# Patient Record
Sex: MALE | Race: WHITE | NOT HISPANIC OR LATINO | Employment: FULL TIME | URBAN - METROPOLITAN AREA
[De-identification: names, ages, dates, MRNs, and addresses within clinical notes are randomized per-mention and may not be internally consistent; named-entity substitution may affect disease eponyms.]

---

## 2024-03-31 ENCOUNTER — APPOINTMENT (EMERGENCY)
Dept: RADIOLOGY | Facility: HOSPITAL | Age: 49
End: 2024-03-31
Payer: COMMERCIAL

## 2024-03-31 ENCOUNTER — HOSPITAL ENCOUNTER (EMERGENCY)
Facility: HOSPITAL | Age: 49
Discharge: HOME | End: 2024-03-31
Attending: EMERGENCY MEDICINE
Payer: COMMERCIAL

## 2024-03-31 VITALS
DIASTOLIC BLOOD PRESSURE: 76 MMHG | HEART RATE: 120 BPM | OXYGEN SATURATION: 98 % | SYSTOLIC BLOOD PRESSURE: 153 MMHG | TEMPERATURE: 97.9 F | RESPIRATION RATE: 18 BRPM

## 2024-03-31 DIAGNOSIS — K59.00 CONSTIPATION: Primary | ICD-10-CM

## 2024-03-31 DIAGNOSIS — K92.1 BLOOD IN STOOL: ICD-10-CM

## 2024-03-31 LAB
ALBUMIN SERPL BCP-MCNC: 4.5 G/DL (ref 3.5–5)
ALP SERPL-CCNC: 89 U/L (ref 34–104)
ALT SERPL W P-5'-P-CCNC: 19 U/L (ref 7–52)
ANION GAP SERPL CALCULATED.3IONS-SCNC: 8 MMOL/L (ref 4–13)
AST SERPL W P-5'-P-CCNC: 22 U/L (ref 13–39)
BASOPHILS # BLD AUTO: 0.04 THOUSANDS/ÂΜL (ref 0–0.1)
BASOPHILS NFR BLD AUTO: 1 % (ref 0–1)
BILIRUB SERPL-MCNC: 0.43 MG/DL (ref 0.2–1)
BUN SERPL-MCNC: 13 MG/DL (ref 5–25)
CALCIUM SERPL-MCNC: 9.1 MG/DL (ref 8.4–10.2)
CHLORIDE SERPL-SCNC: 102 MMOL/L (ref 96–108)
CO2 SERPL-SCNC: 29 MMOL/L (ref 21–32)
CREAT SERPL-MCNC: 0.83 MG/DL (ref 0.6–1.3)
EOSINOPHIL # BLD AUTO: 0.14 THOUSAND/ÂΜL (ref 0–0.61)
EOSINOPHIL NFR BLD AUTO: 2 % (ref 0–6)
ERYTHROCYTE [DISTWIDTH] IN BLOOD BY AUTOMATED COUNT: 11.6 % (ref 11.6–15.1)
GFR SERPL CREATININE-BSD FRML MDRD: 101 ML/MIN/1.73SQ M
GLUCOSE SERPL-MCNC: 99 MG/DL (ref 65–140)
HCT VFR BLD AUTO: 47.8 % (ref 36.5–49.3)
HGB BLD-MCNC: 16.3 G/DL (ref 12–17)
IMM GRANULOCYTES # BLD AUTO: 0.02 THOUSAND/UL (ref 0–0.2)
IMM GRANULOCYTES NFR BLD AUTO: 0 % (ref 0–2)
LYMPHOCYTES # BLD AUTO: 1.79 THOUSANDS/ÂΜL (ref 0.6–4.47)
LYMPHOCYTES NFR BLD AUTO: 25 % (ref 14–44)
MCH RBC QN AUTO: 30.8 PG (ref 26.8–34.3)
MCHC RBC AUTO-ENTMCNC: 34.1 G/DL (ref 31.4–37.4)
MCV RBC AUTO: 90 FL (ref 82–98)
MONOCYTES # BLD AUTO: 0.63 THOUSAND/ÂΜL (ref 0.17–1.22)
MONOCYTES NFR BLD AUTO: 9 % (ref 4–12)
NEUTROPHILS # BLD AUTO: 4.66 THOUSANDS/ÂΜL (ref 1.85–7.62)
NEUTS SEG NFR BLD AUTO: 63 % (ref 43–75)
NRBC BLD AUTO-RTO: 0 /100 WBCS
PLATELET # BLD AUTO: 299 THOUSANDS/UL (ref 149–390)
PMV BLD AUTO: 9.2 FL (ref 8.9–12.7)
POTASSIUM SERPL-SCNC: 3.5 MMOL/L (ref 3.5–5.3)
PROT SERPL-MCNC: 7.3 G/DL (ref 6.4–8.4)
RBC # BLD AUTO: 5.3 MILLION/UL (ref 3.88–5.62)
SODIUM SERPL-SCNC: 139 MMOL/L (ref 135–147)
TSH SERPL DL<=0.05 MIU/L-ACNC: 1.28 UIU/ML (ref 0.45–4.5)
WBC # BLD AUTO: 7.28 THOUSAND/UL (ref 4.31–10.16)

## 2024-03-31 PROCEDURE — 36415 COLL VENOUS BLD VENIPUNCTURE: CPT

## 2024-03-31 PROCEDURE — 84443 ASSAY THYROID STIM HORMONE: CPT | Performed by: EMERGENCY MEDICINE

## 2024-03-31 PROCEDURE — 80053 COMPREHEN METABOLIC PANEL: CPT

## 2024-03-31 PROCEDURE — 74177 CT ABD & PELVIS W/CONTRAST: CPT

## 2024-03-31 PROCEDURE — 99285 EMERGENCY DEPT VISIT HI MDM: CPT | Performed by: EMERGENCY MEDICINE

## 2024-03-31 PROCEDURE — 85025 COMPLETE CBC W/AUTO DIFF WBC: CPT

## 2024-03-31 PROCEDURE — 99285 EMERGENCY DEPT VISIT HI MDM: CPT

## 2024-03-31 RX ORDER — DOCUSATE SODIUM 100 MG/1
100 CAPSULE, LIQUID FILLED ORAL EVERY 12 HOURS
Qty: 60 CAPSULE | Refills: 0 | Status: SHIPPED | OUTPATIENT
Start: 2024-03-31 | End: 2024-04-10 | Stop reason: ALTCHOICE

## 2024-03-31 RX ORDER — SENNOSIDES 8.6 MG
8.6 TABLET ORAL
Qty: 30 TABLET | Refills: 0 | Status: SHIPPED | OUTPATIENT
Start: 2024-03-31 | End: 2024-04-30

## 2024-03-31 RX ORDER — MAGNESIUM CARB/ALUMINUM HYDROX 105-160MG
296 TABLET,CHEWABLE ORAL ONCE
Status: COMPLETED | OUTPATIENT
Start: 2024-03-31 | End: 2024-03-31

## 2024-03-31 RX ADMIN — BE HEALTH MAGNESIUM CITRATE ORAL SOLUTION - LEMON 296 ML: 1.75 LIQUID ORAL at 23:40

## 2024-03-31 RX ADMIN — IOHEXOL 100 ML: 350 INJECTION, SOLUTION INTRAVENOUS at 21:38

## 2024-04-01 NOTE — ED ATTENDING ATTESTATION
3/31/2024  I, Gabino Damon MD, saw and evaluated the patient. I have discussed the patient with the resident/non-physician practitioner and agree with the resident's/non-physician practitioner's findings, Plan of Care, and MDM as documented in the resident's/non-physician practitioner's note, except where noted. All available labs and Radiology studies were reviewed.  I was present for key portions of any procedure(s) performed by the resident/non-physician practitioner and I was immediately available to provide assistance.       At this point I agree with the current assessment done in the Emergency Department.  I have conducted an independent evaluation of this patient a history and physical is as follows:    ED Course       49-year-old male presents with history of constipation and hard stools presenting with rectal pain and rectal bleeding x 2 tonight.  Patient states he was trying to pass a stool when he had severe pain in his rectum as well as bright red blood per rectum.  Patient denies any fevers or chills nausea or vomiting states that he is due for a colonoscopy by GI either manage she has constipation with medications.    Vitals reviewed abdomen soft with mild left lower quadrant tenderness to palpation no guarding or rebound.  Digital rectal exam done by resident shows no gross blood brown stool with mild tenderness at the 2 o'clock position on rectal exam.    Impression: Rectal pain, constipation  Differential diagnosis: Anal fissure, internal hemorrhoids, diverticular bleed, diverticulosis, diverticulitis    Plan check labs CTAP will offer patient mag citrate for constipation symptoms    If negative ED workup anticipate discharge with outpatient follow-up with GI for colonoscopy and further evaluation and management.    Labs reviewed CBC and CMP unremarkable.  TSH was sent to exclude endocrine causes of constipation which was within normal limits.  CT abdomen pelvis reviewed report no acute  pathology.      Critical Care Time  Procedures

## 2024-04-01 NOTE — ED PROVIDER NOTES
History  Chief Complaint   Patient presents with    Black or Bloody Stool     Reports dark and bloody stool the last few days. States problems with having a BM     HPI  Patient is 49-year-old male with past medical history of constipation and hard stools presenting to ED for rectal plain and bleeding.  Patient reports several difficult bowel movements yesterday and today and noticed bright red blood per rectum while wiping. Patient also reports mild abdominal fullness. Patient reports has seen GI for constipation in the past, but has not had colonoscopy. Patient denies fever, chills, nausea/vomiting.  None       History reviewed. No pertinent past medical history.    History reviewed. No pertinent surgical history.    History reviewed. No pertinent family history.  I have reviewed and agree with the history as documented.    E-Cigarette/Vaping     E-Cigarette/Vaping Substances     Social History     Tobacco Use    Smoking status: Never    Smokeless tobacco: Never        Review of Systems   Constitutional:  Negative for chills and fever.   HENT:  Negative for ear pain and sore throat.    Respiratory:  Negative for cough and shortness of breath.    Cardiovascular:  Negative for chest pain, palpitations and leg swelling.   Gastrointestinal:  Positive for abdominal pain, blood in stool and constipation. Negative for diarrhea, nausea and vomiting.   Genitourinary:  Negative for dysuria, frequency and hematuria.   Musculoskeletal:  Negative for back pain and neck pain.   Skin:  Negative for rash.   Neurological:  Negative for dizziness, light-headedness and headaches.       Physical Exam  ED Triage Vitals [03/31/24 1940]   Temperature Pulse Respirations Blood Pressure SpO2   97.9 °F (36.6 °C) (!) 120 18 153/76 98 %      Temp Source Heart Rate Source Patient Position - Orthostatic VS BP Location FiO2 (%)   Oral Monitor Lying Left arm --      Pain Score       --             Orthostatic Vital Signs  Vitals:    03/31/24 1940    BP: 153/76   Pulse: (!) 120   Patient Position - Orthostatic VS: Lying       Physical Exam  Vitals reviewed. Exam conducted with a chaperone present.   Constitutional:       General: He is awake.   HENT:      Head: Normocephalic and atraumatic.      Mouth/Throat:      Mouth: Mucous membranes are moist.   Eyes:      Extraocular Movements: Extraocular movements intact.      Right eye: No nystagmus.      Left eye: No nystagmus.      Conjunctiva/sclera: Conjunctivae normal.      Pupils: Pupils are equal, round, and reactive to light.   Cardiovascular:      Rate and Rhythm: Normal rate and regular rhythm.      Pulses: Normal pulses.      Heart sounds: Normal heart sounds, S1 normal and S2 normal. Heart sounds not distant. No murmur heard.     No friction rub. No gallop.   Pulmonary:      Breath sounds: No stridor. No wheezing, rhonchi or rales.      Comments: CTA b/l   Abdominal:      General: Bowel sounds are normal.      Palpations: Abdomen is soft.      Tenderness: There is no abdominal tenderness.   Genitourinary:     Comments: No external hemorrhoids visualized.  Mild tenderness with rectal exam at 2 o'clock, no fissures or internal hemorrhoids palpated. Hemoccult negative  Musculoskeletal:      Right lower leg: No edema.      Left lower leg: No edema.   Skin:     General: Skin is warm and dry.      Capillary Refill: Capillary refill takes less than 2 seconds.   Neurological:      Mental Status: He is alert and oriented to person, place, and time.      GCS: GCS eye subscore is 4. GCS verbal subscore is 5. GCS motor subscore is 6.         ED Medications  Medications   iohexol (OMNIPAQUE) 350 MG/ML injection (MULTI-DOSE) 100 mL (100 mL Intravenous Given 3/31/24 2138)   magnesium citrate (CITROMA) oral solution 296 mL (296 mL Oral Given 3/31/24 2340)       Diagnostic Studies  Results Reviewed       Procedure Component Value Units Date/Time    TSH, 3rd generation with Free T4 reflex [[id number]]  (Normal) Collected:  03/31/24 2100    Lab Status: Final result Specimen: Blood from Arm, Left Updated: 03/31/24 2142     TSH 3RD GENERATON 1.283 uIU/mL     Comprehensive metabolic panel [[id number]] Collected: 03/31/24 2027    Lab Status: Final result Specimen: Blood from Arm, Left Updated: 03/31/24 2056     Sodium 139 mmol/L      Potassium 3.5 mmol/L      Chloride 102 mmol/L      CO2 29 mmol/L      ANION GAP 8 mmol/L      BUN 13 mg/dL      Creatinine 0.83 mg/dL      Glucose 99 mg/dL      Calcium 9.1 mg/dL      AST 22 U/L      ALT 19 U/L      Alkaline Phosphatase 89 U/L      Total Protein 7.3 g/dL      Albumin 4.5 g/dL      Total Bilirubin 0.43 mg/dL      eGFR 101 ml/min/1.73sq m     Narrative:      National Kid[employer] Foundation guidelines for Chronic Kidney Disease (CKD):     Stage 1 with normal or high GFR (GFR > 90 mL/min/1.73 square meters)    Stage 2 Mild CKD (GFR = 60-89 mL/min/1.73 square meters)    Stage 3A Moderate CKD (GFR = 45-59 mL/min/1.73 square meters)    Stage 3B Moderate CKD (GFR = 30-44 mL/min/1.73 square meters)    Stage 4 Severe CKD (GFR = 15-29 mL/min/1.73 square meters)    Stage 5 End Stage CKD (GFR <15 mL/min/1.73 square meters)  Note: GFR calculation is accurate only with a steady state creatinine    CBC and differential [[id number]] Collected: 03/31/24 2027    Lab Status: Final result Specimen: Blood from Arm, Left Updated: 03/31/24 2034     WBC 7.28 Thousand/uL      RBC 5.30 Million/uL      Hemoglobin 16.3 g/dL      Hematocrit 47.8 %      MCV 90 fL      MCH 30.8 pg      MCHC 34.1 g/dL      RDW 11.6 %      MPV 9.2 fL      Platelets 299 Thousands/uL      nRBC 0 /100 WBCs      Neutrophils Relative 63 %      Immature Grans % 0 %      Lymphocytes Relative 25 %      Monocytes Relative 9 %      Eosinophils Relative 2 %      Basophils Relative 1 %      Neutrophils Absolute 4.66 Thousands/µL      Absolute Immature Grans 0.02 Thousand/uL      Absolute Lymphocytes 1.79 Thousands/µL      Absolute Monocytes 0.63  Thousand/µL      Eosinophils Absolute 0.14 Thousand/µL      Basophils Absolute 0.04 Thousands/µL                    CT abdomen pelvis with contrast   Final Result by Rafiq Delgadillo MD (04/01 0831)      No acute pathology.      Finding (s) were preliminarily reported by [facility] service at the time of examination.               Workstation performed: [id number]               Procedures  Procedures      ED Course  ED Course as of 04/02/24 2349   Sun Mar 31, 2024   2039 WBC: 7.28   2039 Hemoglobin: 16.3   2039 Platelet Count: 299   2111 Comprehensive metabolic panel  Unremarkable    2151 TSH 3RD GENERATON: 1.283   2250 CT abdomen pelvis with contrast  Vrad: IMPRESSION: 1. No abdominal or pelvic masses. 2. No evidence of acute inflammation.     2250 Will discharge with bowel regimen and GI follow up.                                        Medical Decision Making  Amount and/or Complexity of Data Reviewed  Labs: ordered. Decision-making details documented in ED Course.  Radiology: ordered. Decision-making details documented in ED Course.    Risk  OTC drugs.  Prescription drug management.        Patient is 49 y.o. male with PMH of constipation and hard stools presenting to ED for rectal plain and bleeding See history and physical documented above.     Differential diagnosis included but not limited to anal fissure, internal hemorrhoids, diverticular bleeding, diverticulosis, diverticulitis, constipation. Plan CBC, CMP, TSH. CT abdomen/pelvis offered and requested.    View ED course above for further discussion on patient workup.     All labs reviewed and utilized in the medical decision making process  All radiology studies independently viewed by me and interpreted by the radiologist.  I reviewed all testing with the patient.     Upon re-evaluation discussed labs and CT with patient. .Will give magnesium citrate to help with bowel movements as well as prescribe bowel regimen to relieve  constipation.    I have reviewed the patient's vital signs, nursing notes, and other relevant tests/information. I had a detailed discussion with the patient regarding the history, exam findings, and any diagnostic results.   Plan to discharge home in stable condition with constipation, follow up with GI.  Discussed with patient who is agreeable to plan.  I discussed discharge instructions, need for follow-up, and oral return precautions for what to return for in addition to the written return precautions and discharge instructions, specifically highlighting areas of special concern.  The patient verbalized understanding of the discharge instructions and warnings that would necessitate return to the Emergency Department including worsening abdominal pain, fevers, nausea/vomiting.  All questions the patient had were answered prior to discharge to the best of my ability.       Disposition  Final diagnoses:   Constipation   Blood in stool     Time reflects when diagnosis was documented in both MDM as applicable and the Disposition within this note       Time User Action Codes Description Comment    3/31/2024 11:25 PM Basia Salazar [K59.00] Constipation     3/31/2024 11:25 PM Basia Salazar [K92.1] Blood in stool           ED Disposition       ED Disposition   Discharge    Condition   Stable    Date/Time   Sun Mar 31, 2024 11:04 PM    Comment   Devon Rollins discharge to home/self care.                   Follow-up Information       Follow up With Specialties Details Why Contact Info Additional Information    [facility] Medicine Call  Establish primary care [address] [address] [phone] [facility], [address], [place], [address] [phone]            Discharge Medication List as of 3/31/2024 11:35 PM        START taking these medications    Details   docusate sodium  (COLACE) 100 mg capsule Take 1 capsule (100 mg total) by mouth every 12 (twelve) hours, Starting Sun 3/31/2024, Normal      senna (SENOKOT) 8.6 mg Take 1 tablet (8.6 mg total) by mouth daily at bedtime, Starting Sun 3/31/2024, Until Tue 4/30/2024, Normal               PDMP Review       None             ED Provider  Attending physically available and evaluated Devon Rollins. I managed the patient along with the ED Attending.    Electronically Signed by           Basia Salazar DO  04/02/24 6966

## 2024-04-01 NOTE — DISCHARGE INSTRUCTIONS
You were seen in the ED for blood in stool. Your workup was negative for anything dangerous.     Your CT showed constipation.     Please drink entire bottle of mag citrate to help with constipation.     You can also colace twice daily and senokot at night to help regulate your bowels.     Please establish care with PCP and GI for follow up.     Please return to ED if you have worsening abdominal pain, fevers, nausea/vomiting.

## 2024-04-02 ENCOUNTER — TELEPHONE (OUTPATIENT)
Age: 49
End: 2024-04-02

## 2024-04-02 NOTE — TELEPHONE ENCOUNTER
Patient called in requesting a virtual visit. Patient was informed due to symptoms he would need to be seen in the office. Patient states he will call tomorrow if he would need to reschedule.

## 2024-04-06 ENCOUNTER — OFFICE VISIT (OUTPATIENT)
Dept: URGENT CARE | Facility: MEDICAL CENTER | Age: 49
End: 2024-04-06
Payer: COMMERCIAL

## 2024-04-06 VITALS
TEMPERATURE: 98 F | DIASTOLIC BLOOD PRESSURE: 72 MMHG | RESPIRATION RATE: 18 BRPM | HEART RATE: 75 BPM | SYSTOLIC BLOOD PRESSURE: 125 MMHG | OXYGEN SATURATION: 99 %

## 2024-04-06 DIAGNOSIS — F41.0 PANIC ATTACKS: Primary | ICD-10-CM

## 2024-04-06 DIAGNOSIS — Z76.89 ENCOUNTER TO ESTABLISH CARE: ICD-10-CM

## 2024-04-06 PROCEDURE — G0381 LEV 2 HOSP TYPE B ED VISIT: HCPCS

## 2024-04-06 RX ORDER — MULTIVITAMIN WITH FOLIC ACID 400 MCG
1 TABLET ORAL DAILY
COMMUNITY

## 2024-04-06 RX ORDER — OMEGA-3/DHA/EPA/FISH OIL 300-1000MG
CAPSULE ORAL
COMMUNITY

## 2024-04-06 RX ORDER — ALPRAZOLAM 0.25 MG/1
0.25 TABLET ORAL
Qty: 10 TABLET | Refills: 0 | Status: SHIPPED | OUTPATIENT
Start: 2024-04-06

## 2024-04-06 RX ORDER — IBUPROFEN 200 MG
200 TABLET ORAL
COMMUNITY

## 2024-04-06 NOTE — PROGRESS NOTES
[facility]        NAME: Devon Rollins Jr. is a 49 y.o. male  : 1975    MRN: [id number]  DATE: 2024  TIME: 8:09 PM    Assessment and Plan   Panic attacks [F41.0]  1. Panic attacks  ALPRAZolam (XANAX) 0.25 mg tablet    Ambulatory Referral to Family Practice      2. Encounter to establish care  Ambulatory Referral to Family Practice        History of anxiety, recent worsening in panic attacks, possibly related to work stress. No suicidal or homicidal thoughts. Short supply of Xanax provided. Referral to PCP provided, patient has not been able to establish care since moving here about one year ago.    Patient Instructions     Prescribed short supply of Xanax, take as needed for anxiety/panic attacks (may cause drowsiness).  It is important you establish care with a primary care provider for further management of your anxiety and other health conditions.    Proceed to  ER if symptoms worsen.    If tests are performed, our office will contact you with results only if changes need to made to the care plan discussed with you at the visit. You can review your full results on [facility].    Chief Complaint     Chief Complaint   Patient presents with    Anxiety     Patient c/o panic attacks that have been more frequent over the last 2-3 weeks.           History of Present Illness       Patient presents with worsening panic attacks over the past 2-3 weeks. He is dealing with an increase in his workload recently. He notes he has been staying up late and not sleeping well. His panic attacks last from minutes to hours. When they occur, he has a tightness in his chest, racing heart, shortness of breath. He has been on Paxil and Zoloft in the past without any relief. He also tried Xanax for acute anxiety and notes that seemed to help. He has done therapy in the past without significant relief. His last panic attack was yesterday afternoon. The panic attacks are significantly affecting his  activities of daily living. He denies any thoughts of harming himself or others. He does not have any panic attack symptoms currently.        Review of Systems   Review of Systems   Respiratory:  Positive for chest tightness and shortness of breath.    Cardiovascular:  Positive for palpitations.   Psychiatric/Behavioral:  Negative for self-injury and suicidal ideas. The patient is nervous/anxious.      No symptoms currently.      Current Medications       Current Outpatient Medications:     ALPRAZolam (XANAX) 0.25 mg tablet, Take 1 tablet (0.25 mg total) by mouth daily at bedtime as needed for anxiety, Disp: 10 tablet, Rfl: 0    cholecalciferol 1,000 units tablet, Take 1,000 Units by mouth daily, Disp: , Rfl:     docusate sodium (COLACE) 100 mg capsule, Take 1 capsule (100 mg total) by mouth every 12 (twelve) hours, Disp: 60 capsule, Rfl: 0    fish oil-omega-3 fatty acids 1000 MG capsule, Take by mouth, Disp: , Rfl:     ibuprofen (MOTRIN) 200 mg tablet, 200 mg, Disp: , Rfl:     Magnesium 100 MG CAPS, Take by mouth, Disp: , Rfl:     Multiple Vitamin (Tab-A-Constantino) TABS, Take 1 tablet by mouth daily, Disp: , Rfl:     senna (SENOKOT) 8.6 mg, Take 1 tablet (8.6 mg total) by mouth daily at bedtime, Disp: 30 tablet, Rfl: 0    Current Allergies     Allergies as of 04/06/2024    (No Known Allergies)            The following portions of the patient's history were reviewed and updated as appropriate: allergies, current medications, past family history, past medical history, past social history, past surgical history and problem list.     No past medical history on file.    No past surgical history on file.    No family history on file.      Medications have been verified.        Objective   /72   Pulse 75   Temp 98 °F (36.7 °C)   Resp 18   SpO2 99%        Physical Exam     Physical Exam  Vitals and nursing note reviewed.   Constitutional:       General: He is not in acute distress.     Appearance: Normal appearance.  He is not ill-appearing.   Cardiovascular:      Rate and Rhythm: Normal rate and regular rhythm.      Pulses: Normal pulses.      Heart sounds: Normal heart sounds.   Pulmonary:      Effort: Pulmonary effort is normal.      Breath sounds: Normal breath sounds.   Neurological:      Mental Status: He is alert.

## 2024-04-06 NOTE — PATIENT INSTRUCTIONS
Prescribed short supply of Xanax, take as needed for anxiety/panic attacks (may cause drowsiness).  It is important you establish care with a primary care provider for further management of your anxiety and other health conditions.    Proceed to  ER if symptoms worsen.    If tests are performed, our office will contact you with results only if changes need to made to the care plan discussed with you at the visit. You can review your full results on [facility] My[facility].    Panic Attack   AMBULATORY CARE:   A panic attack  is a strong feeling of fear or discomfort. The attack starts suddenly, is worst 10 minutes after it starts, and stops within 20 minutes. An attack may be triggered by something you do, such as public speaking. Exposure to something you are afraid of can also trigger an attack. A panic attack can also happen for no clear reason. Panic attacks that happen often may be a sign of a panic disorder that needs long-term treatment.  Common signs and symptoms of a panic attack:   Chest pain or discomfort, or fast or irregular heartbeats    Sweating, trembling, lightheartedness, or fainting    Hyperventilation (breathing so quickly you become dizzy, lightheaded, or faint)    Shortness of breath, trouble breathing, or a feeling that you are choking or smothering    Pale or cold skin, chills, or hot flashes    Nausea, vomiting, or abdominal pain    A feeling that you are separate from your body       Call your local emergency number (911 in the [place]) if:   You have any of the following signs of a heart attack:      Squeezing, pressure, or pain in your chest    You may  also have any of the following:     Discomfort or pain in your back, neck, jaw, stomach, or arm    Shortness of breath    Nausea or vomiting    Lightheadedness or a sudden cold sweat      Call your doctor or therapist if:   You have new or worsening panic attacks after treatment.    You have questions or concerns about your condition or  care.    Treatment  may include any of the following:  Medicines  may be given to make you feel more relaxed or to reduce anxiety that causes a panic attack. Some medicines are taken only when you are having a panic attack. Other medicines can be taken to prevent panic attacks.     A behavior therapist  can help you learn to control how your body responds to stressful situations. The therapist may also teach you ways to relax your muscles and slow your breathing during a panic attack. The therapist may teach you ways to assure yourself that the panic attack will not get worse. You may also learn ways to prevent or stop hyperventilation.    Exposure therapy  is used to help you change your reaction to triggers. You are exposed to your panic attack triggers in small amounts. The amount of exposure is slowly increased until it no longer triggers a panic attack.    Manage or prevent a panic attack:   Manage stress.  Stress can trigger a panic attack. Ways to lower your stress level include yoga, meditation, and talking to someone about the stress in your life.    Exercise as directed.  Exercise can reduce stress and help you sleep better. Try to get at least 30 minutes of physical activity on most days of the week. Your healthcare provider can help you create an exercise plan.    Set a sleep schedule.  Too little sleep can increase anxiety. Go to bed at the same time each night and wake up at the same time each morning. Keep your room quiet and free from distractions, such as a television or computer.    Eat a variety of healthy foods.  Healthy foods include fruits, vegetables, low-fat dairy products, lean meats, fish, and beans. Limit sugar. Sugar can increase your symptoms.    Do not have foods or drinks that contain caffeine.  These include coffee, tea, soda, energy drinks, and chocolate. Caffeine can make anxiety worse or trigger a panic attack.    Limit alcohol.  You may think alcohol makes you calmer, but it is  not a safe or effective way to control anxiety. Alcohol can increase anxiety if you drink large amounts or drink often. Ask your healthcare provider how much alcohol is okay for you to drink. A drink of alcohol is 12 ounces of beer, 5 ounces of wine, or 1½ ounces of liquor.    Do not smoke.  Nicotine and other chemicals in cigarettes and cigars can increase anxiety. Ask your healthcare provider for information if you currently smoke and need help to quit. E-cigarettes or smokeless tobacco still contain nicotine. Talk to your healthcare provider before you use these products.       Follow up with your doctor or therapist as directed:  Write down your questions so you remember to ask them during your visits.  © Copyright [employer] 2023 Information is for End User's use only and may not be sold, redistributed or otherwise used for commercial purposes.  The above information is an  only. It is not intended as medical advice for individual conditions or treatments. Talk to your doctor, nurse or pharmacist before following any medical regimen to see if it is safe and effective for you.

## 2024-04-10 ENCOUNTER — OFFICE VISIT (OUTPATIENT)
Dept: GASTROENTEROLOGY | Facility: CLINIC | Age: 49
End: 2024-04-10

## 2024-04-10 ENCOUNTER — PREP FOR PROCEDURE (OUTPATIENT)
Dept: GASTROENTEROLOGY | Facility: CLINIC | Age: 49
End: 2024-04-10

## 2024-04-10 VITALS
SYSTOLIC BLOOD PRESSURE: 100 MMHG | BODY MASS INDEX: 20.59 KG/M2 | WEIGHT: 152 LBS | HEART RATE: 80 BPM | DIASTOLIC BLOOD PRESSURE: 66 MMHG | HEIGHT: 72 IN

## 2024-04-10 DIAGNOSIS — R19.4 CHANGE IN BOWEL HABIT: Primary | ICD-10-CM

## 2024-04-10 DIAGNOSIS — R14.0 BLOATING: ICD-10-CM

## 2024-04-10 DIAGNOSIS — K92.2 LOWER GI BLEEDING: ICD-10-CM

## 2024-04-10 DIAGNOSIS — K59.09 CHRONIC CONSTIPATION: ICD-10-CM

## 2024-04-10 PROBLEM — E78.00 HYPERCHOLESTEREMIA: Status: ACTIVE | Noted: 2024-04-10

## 2024-04-10 PROBLEM — F32.A DEPRESSION: Status: ACTIVE | Noted: 2024-04-10

## 2024-04-10 PROBLEM — F41.1 GAD (GENERALIZED ANXIETY DISORDER): Status: ACTIVE | Noted: 2024-04-10

## 2024-04-10 NOTE — PATIENT INSTRUCTIONS
Can use GasX OTC as needed   Drink at least 64 oz water/ day   Stop Colace and start miralax OTC once daily instead   Can continue senna at bedtime     Scheduled date of colonoscopy (as of today): 05/20/2024  Physician performing colonoscopy: Dr. Krishna   Location of colonoscopy:   Bowel prep reviewed with patient: 2 Day [facility]   Instructions reviewed with patient by: Breana LOREDO   Clearances:  N/A

## 2024-04-10 NOTE — PROGRESS NOTES
[facility] - Outpatient Consultation New Patient  Devon Rollins Jr. 49 y.o. male MRN: [id number]  Encounter: [id number]    ASSESSMENT AND PLAN:    1. Change in bowel habit  2. Chronic constipation  3. Lower GI bleeding  4. Bloating  Patient presenting with constipation for many years with recent worsening over the last several months.  There is associated lower GI bleeding and bloating.  His weight has been stable.  No prior colonoscopy.  He denies family history of colon cancer or colon polyps.    At this time we will order celiac panel and CRP.  Discussed with patient that his celiac testing may be an accurate if you follows mostly gluten-free diet already.  I recommended patient start MiraLAX powder over-the-counter, 1 capful daily and stop Colace.  Discussed can continue to use senna once daily at bedtime if needed.  I also recommended that he start more of a high-fiber diet and increase his water intake and activity level.  Discussed that he can use Gas-X over-the-counter as needed for gas and bloating  We will plan for a colonoscopy for further evaluation of his ongoing symptoms, explained to the patient that I recommend he undergo a 2-day bowel prep.  He expressed understanding.    - IgA; Future  - Tissue transglutaminase, IgA; Future  - C-reactive protein; Future  - Colonoscopy; Future  - polyethylene glycol (GOLYTELY) 4000 mL solution; Take 4,000 mL by mouth once for 1 dose Take as directed by office instructions prior to colonoscopy.  Dispense: 4000 mL; Refill: 0  - Ambulatory Referral to Gastroenterology    I obtained informed consent from the patient the risk/benefits/alternatives of the procedure/s were discussed with the patient.  Risks included, but not limited to, infection, bleeding, perforation, injury to organs in the abdomen, missed lesion and incomplete procedure were discussed.  Patient was agreeable and electronic signature was obtained.      Patient was  "instructed to call the office with any questions, concerns, new/ worsening/ persisting GI symptoms. Advised patient go to the ER with any severe or worsening abdominal pain, fevers/ chills, intractable N/V, chest pain, SOB, dizziness, lightheadedness, feeling something stuck in esophagus that will not go down. Patient expressed understanding and is in agreement with treatment plan.       Will plan to follow up after diagnostic tests   ________________________________________________________    HPI:      Patient is new to me in our office.  Patient with a past medical history of anxiety and depression, hyperlipidemia presents to the office today with complaints of constipation.  Patient was seen in the emergency room 3/31/2024 with complaints of constipation, rectal pain, bleeding.  ER note was reviewed.  Rectal exam was performed in the ER which showed no obvious external hemorrhoids.  There is mild tenderness with rectal exam at 2:00 but no fissures or internal hemorrhoids were palpated.  Hemoccult testing was negative.  He underwent blood tests in the ER which revealed normal CBC, CMP, TSH.  He underwent CT of the abdomen and pelvis with contrast, this was reviewed, this showed no abdominal or pelvic masses, no evidence of acute inflammation.  He was discharged with on Colace and senna and told to follow-up with GI.    Patient complains of constipation x many years.   He complains that his constipation has worsened over the last few years, and moreso  the constipation has worsened over the last several months.   He can go several days without a BM.   Stool caliber is often bristol stool type 1 or \"very thin\".   There is associated abdominal bloating, gas, nausea   His constipation can be improved with high fiber foods sometimes but this makes his abdominal bloating and gas worse.  He does know his constipation improves with exercise  He states he was told he had gluten sensitivity in the past. Unsure if was ever " tested for celiac specifically. He trys to avoid gluten generally.   He does endorse occasional rectal bleeding as well. There was a significant amount of blood in toilet bowl prior to ER visit.   Since ER visit he is taking colace BID and senna once daily at bedtime. He this may be helping.   Patient denies any fevers/ chills, unintentional weight loss, abdominal pain,  vomiting,  heartburn, dysphagia, odynophagia.   Denies chest pain, SOB    Tobacco use- None  Alcohol use- None  NSAID use- None  No prior colonoscopy   No FH of CRC or colon polyps     REVIEW OF SYSTEMS:    Review of Systems   Constitutional:  Positive for appetite change (decreased). Negative for chills and fever.   HENT:  Negative for ear pain and sore throat.    Eyes:  Negative for pain and visual disturbance.   Respiratory:  Negative for cough and shortness of breath.    Cardiovascular:  Negative for chest pain and palpitations.   Gastrointestinal:  Positive for constipation and nausea. Negative for abdominal pain and vomiting.   Genitourinary:  Negative for dysuria and hematuria.   Musculoskeletal:  Negative for arthralgias and back pain.   Skin:  Negative for color change and rash.   Neurological:  Negative for seizures and syncope.   All other systems reviewed and are negative.       Historical Information   History reviewed. No pertinent past medical history.  Past Surgical History:   Procedure Laterality Date    KNEE ARTHROSCOPY      SHOULDER ARTHROSCOPY       Social History   Social History     Substance and Sexual Activity   Alcohol Use Yes    Comment: social     Social History     Substance and Sexual Activity   Drug Use Never     Social History     Tobacco Use   Smoking Status Never   Smokeless Tobacco Never     Family History   Problem Relation Age of Onset    Stomach cancer Cousin        Meds/Allergies       Current Outpatient Medications:     ALPRAZolam (XANAX) 0.25 mg tablet    cholecalciferol 1,000 units tablet    docusate sodium  (COLACE) 100 mg capsule    fish oil-omega-3 fatty acids 1000 MG capsule    ibuprofen (MOTRIN) 200 mg tablet    Magnesium 100 MG CAPS    Multiple Vitamin (Tab-A-Constantino) TABS    senna (SENOKOT) 8.6 mg    No Known Allergies        Objective   Wt Readings from Last 3 Encounters:   04/10/24 68.9 kg (152 lb)     Temp Readings from Last 3 Encounters:   04/06/24 98 °F (36.7 °C)   03/31/24 97.9 °F (36.6 °C) (Oral)     BP Readings from Last 3 Encounters:   04/10/24 100/66   04/06/24 125/72   03/31/24 153/76     Pulse Readings from Last 3 Encounters:   04/10/24 80   04/06/24 75   03/31/24 (!) 120        PHYSICAL EXAM:     Physical Exam  Vitals reviewed.   Constitutional:       General: He is not in acute distress.     Appearance: He is well-developed.   HENT:      Head: Normocephalic and atraumatic.   Eyes:      Conjunctiva/sclera: Conjunctivae normal.   Cardiovascular:      Rate and Rhythm: Normal rate and regular rhythm.      Heart sounds: No murmur heard.  Pulmonary:      Effort: Pulmonary effort is normal. No respiratory distress.      Breath sounds: Normal breath sounds.   Abdominal:      General: Bowel sounds are normal.      Palpations: Abdomen is soft.      Tenderness: There is no abdominal tenderness.   Musculoskeletal:         General: No swelling.      Cervical back: Neck supple.   Skin:     General: Skin is warm and dry.      Capillary Refill: Capillary refill takes less than 2 seconds.   Neurological:      General: No focal deficit present.      Mental Status: He is alert and oriented to person, place, and time.   Psychiatric:         Mood and Affect: Mood normal.         Behavior: Behavior normal.           Lab Results:   Lab Results   Component Value Date    WBC 7.28 03/31/2024    HGB 16.3 03/31/2024    HCT 47.8 03/31/2024    MCV 90 03/31/2024     03/31/2024       Lab Results   Component Value Date    SODIUM 139 03/31/2024    K 3.5 03/31/2024     03/31/2024    CO2 29 03/31/2024    AGAP 8 03/31/2024     BUN 13 03/31/2024    CREATININE 0.83 03/31/2024    GLUC 99 03/31/2024    CALCIUM 9.1 03/31/2024    AST 22 03/31/2024    ALT 19 03/31/2024    ALKPHOS 89 03/31/2024    TP 7.3 03/31/2024    TBILI 0.43 03/31/2024    EGFR 101 03/31/2024     Lab Results   Component Value Date    BSK5IWEYVKFB 1.283 03/31/2024    TSH 1.06 04/08/2019         Radiology Results:   CT abdomen pelvis with contrast    Result Date: 4/1/2024  Narrative: CT ABDOMEN AND PELVIS WITH IV CONTRAST INDICATION: LLQ pain rectal pain. COMPARISON: None. TECHNIQUE: CT examination of the abdomen and pelvis was performed. Multiplanar 2D reformatted images were created from the source data. This examination, like all CT scans performed in the [facility], was performed utilizing techniques to minimize radiation dose exposure, including the use of iterative reconstruction and automated exposure control. Radiation dose length product (DLP) for this visit: 314.52 mGy-cm IV Contrast: 100 mL of iohexol (OMNIPAQUE) Enteric Contrast: Not administered. FINDINGS: ABDOMEN LOWER CHEST: No clinically significant abnormality in the visualized lower chest. LIVER/BILIARY TREE: Unremarkable. GALLBLADDER: No calcified gallstones. No pericholecystic inflammatory change. SPLEEN: Unremarkable. PANCREAS: Unremarkable. ADRENAL GLANDS: Unremarkable. KIDNEYS/URETERS: Unremarkable. No hydronephrosis. STOMACH AND BOWEL: No obstruction or inflammatory changes. Discoid hyperdensity compatible with medication noted in the duodenum. APPENDIX: Normal. ABDOMINOPELVIC CAVITY: No ascites. No pneumoperitoneum. No lymphadenopathy. VESSELS: Unremarkable for patient's age. PELVIS REPRODUCTIVE ORGANS: Mildly enlarged prostate gland. URINARY BLADDER: Unremarkable. ABDOMINAL WALL/INGUINAL REGIONS: Unremarkable. BONES: No acute fracture or suspicious osseous lesion.     Impression: No acute pathology. Finding (s) were preliminarily reported by [facility] service at  the time of examination. Workstation performed: [id number]       Cielo Gardner PA-C   Available on [employer] Hat

## 2024-04-19 ENCOUNTER — OFFICE VISIT (OUTPATIENT)
Dept: FAMILY MEDICINE CLINIC | Facility: CLINIC | Age: 49
End: 2024-04-19
Payer: COMMERCIAL

## 2024-04-19 ENCOUNTER — TELEPHONE (OUTPATIENT)
Dept: FAMILY MEDICINE CLINIC | Facility: CLINIC | Age: 49
End: 2024-04-19

## 2024-04-19 VITALS
OXYGEN SATURATION: 98 % | HEART RATE: 71 BPM | HEIGHT: 72 IN | DIASTOLIC BLOOD PRESSURE: 70 MMHG | BODY MASS INDEX: 20.59 KG/M2 | TEMPERATURE: 97.1 F | WEIGHT: 152 LBS | SYSTOLIC BLOOD PRESSURE: 120 MMHG

## 2024-04-19 DIAGNOSIS — E78.00 HYPERCHOLESTEREMIA: ICD-10-CM

## 2024-04-19 DIAGNOSIS — Z11.4 SCREENING FOR HIV (HUMAN IMMUNODEFICIENCY VIRUS): ICD-10-CM

## 2024-04-19 DIAGNOSIS — K59.09 OTHER CONSTIPATION: ICD-10-CM

## 2024-04-19 DIAGNOSIS — R53.83 FATIGUE, UNSPECIFIED TYPE: ICD-10-CM

## 2024-04-19 DIAGNOSIS — Z11.59 NEED FOR HEPATITIS C SCREENING TEST: ICD-10-CM

## 2024-04-19 DIAGNOSIS — Z76.89 ENCOUNTER TO ESTABLISH CARE: Primary | ICD-10-CM

## 2024-04-19 DIAGNOSIS — F41.0 PANIC ATTACKS: ICD-10-CM

## 2024-04-19 DIAGNOSIS — Z71.3 DIETARY COUNSELING: ICD-10-CM

## 2024-04-19 DIAGNOSIS — F41.1 GAD (GENERALIZED ANXIETY DISORDER): ICD-10-CM

## 2024-04-19 DIAGNOSIS — F32.2 MODERATELY SEVERE MAJOR DEPRESSION (HCC): ICD-10-CM

## 2024-04-19 DIAGNOSIS — Z71.82 EXERCISE COUNSELING: ICD-10-CM

## 2024-04-19 PROCEDURE — 99204 OFFICE O/P NEW MOD 45 MIN: CPT | Performed by: STUDENT IN AN ORGANIZED HEALTH CARE EDUCATION/TRAINING PROGRAM

## 2024-04-19 RX ORDER — POLYETHYLENE GLYCOL 3350 17 G/17G
17 POWDER, FOR SOLUTION ORAL DAILY
COMMUNITY

## 2024-04-19 RX ORDER — HYDROXYZINE PAMOATE 25 MG/1
25 CAPSULE ORAL 3 TIMES DAILY PRN
Qty: 30 CAPSULE | Refills: 0 | Status: SHIPPED | OUTPATIENT
Start: 2024-04-19 | End: 2024-04-25

## 2024-04-19 NOTE — TELEPHONE ENCOUNTER
Patient requested to call the office and schedule his physical; due within two weeks from his appt on 4/19

## 2024-04-19 NOTE — PATIENT INSTRUCTIONS
Depression   AMBULATORY CARE:   Depression  is a mood disorder that causes feelings of sadness or hopelessness that do not go away. Depression may cause you to lose interest in things you used to enjoy. These feelings may interfere with your daily life.  Common signs and symptoms:   Appetite changes, or weight gain or loss    Trouble falling or staying asleep, or sleeping too much    Fatigue (being mentally and physically tired) or lack of energy    Feeling restless, irritable, or withdrawn    Feeling worthless, hopeless, discouraged, or guilty    Trouble concentrating, remembering things, doing daily tasks, or making decisions    Thoughts about hurting or killing yourself    Call your local emergency number (911 in the [place]) if:   You think about hurting yourself or someone else.    You have done something on purpose to hurt yourself.    Call your therapist or doctor if:   Your symptoms get worse or do not get better with treatment.    Your depression keeps you from doing your regular daily activities.    You have new symptoms since your last visit.    You have questions or concerns about your condition or care.    The following resources are available at any time to help you, if needed:   Contact a suicide prevention organization:        For the [employer] Suicide and Crisis Lifeline:     Call or text [employer]ia     Send a chat on https://Eachbaby.org/chat     Call 2-090-846-1569 (1-800-273-TALK)    For the Suicide Hotline, call 4-450-857-8428 (3-806-BOYGOAQ)    For a list of international numbers: https://save.org/find-help/international-resources/  Treatment for depression  depends on how severe your symptoms are. You may need any of the following:  Cognitive behavioral therapy (CBT)  teaches you how to identify and change negative thought patterns.    Antidepressant medicine  may be given to decrease or manage symptoms. You may need to take this medicine for several weeks before they start working.    Self-care:   Talk to  someone about your depression.  Your healthcare provider may suggest counseling. You might feel more comfortable talking with a friend or family member about your depression. Choose someone you know will be supportive and encouraging.    Get regular physical activity.  Physical activity can lower your stress, improve your mood, and help you sleep better. Work with your healthcare provider to develop a plan that you enjoy.         Create a regular sleep schedule.  A routine can help you relax before bed. Listen to music, read, or do yoga. Try to go to bed and wake up at the same time every day. Sleep is important for emotional health.    Eat a variety of healthy foods.  Healthy foods include fruits, vegetables, whole-grain breads, low-fat dairy products, lean meats, fish, and cooked beans. A healthy meal plan is low in fat, salt, and added sugar.         Do not use alcohol, drugs, or nicotine products.  These can worsen depression or make it hard to manage. Talk to your therapist or healthcare provider if you use any of these products and need help to quit.    Follow up with your therapist or doctor as directed:  Your healthcare provider will monitor your progress at follow-up visits. Your provider will also monitor your medicine if you take antidepressants and ask if the medicine is helping. Tell your provider about any side effects or problems you have with your medicine. The type or amount of medicine may need to be changed. Write down your questions so you remember to ask them during your visits.  For more information or support:   [employer] [place] [employer]  [address]  [place] [address]  Phone: 9- 822 - [phone]  Phone: 0- 791 - [phone]  Web Address: http://www.zulema.org  [address] Suicide and Crisis Lifeline  [address]  [place] [address]  Phone: 3- 546 - 584  Web Address: http://www.suicidepreventionlifeline.org OR https://Grasswire.org/chat/    © Copyright Merative 2023  Information is for End User's use only and may not be sold, redistributed or otherwise used for commercial purposes.  The above information is an  only. It is not intended as medical advice for individual conditions or treatments. Talk to your doctor, nurse or pharmacist before following any medical regimen to see if it is safe and effective for you.

## 2024-04-19 NOTE — ASSESSMENT & PLAN NOTE
Patient not currently interested in SSRI medication.  Will start patient on hydroxyzine 25 mg as needed for panic attacks.  Referral to therapy placed during today's visit.  Patient to return in 2 to 4 weeks for follow-up.

## 2024-04-19 NOTE — ASSESSMENT & PLAN NOTE
PHQ-9 score 16 during today's visit.  Patient has fairly extensive history of anxiety and depression.  Patient reports that he has previously trialed Paxil and Zoloft however had bad effects while on these medications.  Patient is not currently interested in medication, however is interested in therapy to help control both.  Patient will think about different SSRI medication and follow-up with me.  Counseled patient on dietary and exercise modifications to help control mood and improve sleep as well.  Patient to return in 2 to 4 weeks for follow-up.

## 2024-04-19 NOTE — ASSESSMENT & PLAN NOTE
Patient currently on MiraLAX and senna per GI.  Counseled patient on adequate water intake and fiber to help promote bowel movements.  Due to prior episodes of blood in stool, patient getting colonoscopy next month through GI.  Will continue to monitor.

## 2024-04-19 NOTE — PROGRESS NOTES
Name: Devon Rollins Jr.      : 1975      MRN: [id number]  Encounter Provider: Tomasz Catherine MD  Encounter Date: 2024   Encounter department: [facility]    Assessment & Plan     1. Encounter to establish care  -     Ambulatory Referral to Family Practice    2. Moderately severe major depression (HCC)  Assessment & Plan:  PHQ-9 score 16 during today's visit.  Patient has fairly extensive history of anxiety and depression.  Patient reports that he has previously trialed Paxil and Zoloft however had bad effects while on these medications.  Patient is not currently interested in medication, however is interested in therapy to help control both.  Patient will think about different SSRI medication and follow-up with me.  Counseled patient on dietary and exercise modifications to help control mood and improve sleep as well.  Patient to return in 2 to 4 weeks for follow-up.    Orders:  -     Ambulatory referral to Psych Services; Future    3. TIMOTHY (generalized anxiety disorder)  Assessment & Plan:  Patient not currently interested in SSRI medication.  Will start patient on hydroxyzine 25 mg as needed for panic attacks.  Referral to therapy placed during today's visit.  Patient to return in 2 to 4 weeks for follow-up.    Orders:  -     Ambulatory referral to Psych Services; Future  -     hydrOXYzine pamoate (VISTARIL) 25 mg capsule; Take 1 capsule (25 mg total) by mouth 3 (three) times a day as needed for anxiety    4. Other constipation  Assessment & Plan:  Patient currently on MiraLAX and senna per GI.  Counseled patient on adequate water intake and fiber to help promote bowel movements.  Due to prior episodes of blood in stool, patient getting colonoscopy next month through GI.  Will continue to monitor.      5. Fatigue, unspecified type  -     Hemoglobin A1C; Future  -     Vitamin D 25 hydroxy; Future    6. Panic attacks  -     Ambulatory Referral to Family  Practice    7. Hypercholesteremia  Assessment & Plan:  Per history, will recheck lipid panel.    Orders:  -     Lipid Panel with Direct LDL reflex; Future  -     Hemoglobin A1C; Future    8. Need for hepatitis C screening test  -     Hepatitis C Antibody; Future    9. Screening for HIV (human immunodeficiency virus)  -     HIV 1/2 AG/AB w Reflex [facility] for 2 yr old and above; Future    10. Dietary counseling    11. Exercise counseling      Labs and orders placed as indicated above.  Patient to return in 2 to 3 weeks for annual physical.         Subjective      HPI  Patient presenting today to establish care.  Patient has history of anxiety, depression, hypercholesterolemia, and constipation.  Patient reports that his mood and anxiety have worsened, he feels sad and hopeless at times, has poor sleep, feels tired, and has had more frequent panic attacks.  Patient not currently on any anxiety/depression medicine or undergoing therapy.  Patient had poor response to depression medicines in the past and did not find much benefit in therapy.    Review of Systems   Constitutional:  Negative for chills and fever.   HENT:  Negative for sore throat.    Respiratory:  Negative for cough and shortness of breath.    Cardiovascular:  Negative for chest pain and palpitations.   Gastrointestinal:  Negative for abdominal pain, constipation, diarrhea, nausea and vomiting.   Genitourinary:  Negative for difficulty urinating and dysuria.   Neurological:  Negative for dizziness and headaches.       Current Outpatient Medications on File Prior to Visit   Medication Sig   • fish oil-omega-3 fatty acids 1000 MG capsule Take by mouth   • ibuprofen (MOTRIN) 200 mg tablet 200 mg   • Magnesium 100 MG CAPS Take by mouth   • Multiple Vitamin (Tab-A-Constantino) TABS Take 1 tablet by mouth daily   • polyethylene glycol (GOLYTELY) 4000 mL solution Take 4,000 mL by mouth once for 1 dose Take as directed by office instructions prior to colonoscopy.   •  polyethylene glycol (MIRALAX) 17 g packet Take 17 g by mouth daily   • senna (SENOKOT) 8.6 mg Take 1 tablet (8.6 mg total) by mouth daily at bedtime   • [DISCONTINUED] ALPRAZolam (XANAX) 0.25 mg tablet Take 1 tablet (0.25 mg total) by mouth daily at bedtime as needed for anxiety   • [DISCONTINUED] cholecalciferol 1,000 units tablet Take 1,000 Units by mouth daily (Patient not taking: Reported on 4/19/2024)       Objective     /70   Pulse 71   Temp (!) 97.1 °F (36.2 °C)   Ht 6' (1.829 m)   Wt 68.9 kg (152 lb)   SpO2 98%   BMI 20.61 kg/m²     Physical Exam  Constitutional:       Appearance: Normal appearance.   HENT:      Head: Normocephalic and atraumatic.   Cardiovascular:      Rate and Rhythm: Normal rate and regular rhythm.      Heart sounds: Normal heart sounds. No murmur heard.  Pulmonary:      Breath sounds: Normal breath sounds. No wheezing.   Abdominal:      Palpations: Abdomen is soft.      Tenderness: There is no abdominal tenderness.   Musculoskeletal:      Right lower leg: No edema.      Left lower leg: No edema.   Neurological:      Mental Status: He is alert.       Tomasz Catherine MD  Depression Screening Follow-up Plan: Patient's depression screening was positive with a PHQ-2 score of . Their PHQ-9 score was 16. Patient assessed for underlying major depression. They have no active suicidal ideations. Brief counseling provided and recommend additional follow-up/re-evaluation next office visit.

## 2024-04-22 ENCOUNTER — TELEPHONE (OUTPATIENT)
Dept: PSYCHIATRY | Facility: CLINIC | Age: 49
End: 2024-04-22

## 2024-04-22 NOTE — TELEPHONE ENCOUNTER
Contacted PT. in regards to ASAP/STAT Referral, LVM to contact intake to discuss Services needed at this time.

## 2024-04-23 ENCOUNTER — TELEPHONE (OUTPATIENT)
Dept: OTHER | Facility: OTHER | Age: 49
End: 2024-04-23

## 2024-04-23 ENCOUNTER — TRANSCRIBE ORDERS (OUTPATIENT)
Dept: GASTROENTEROLOGY | Facility: CLINIC | Age: 49
End: 2024-04-23

## 2024-04-23 ENCOUNTER — TELEPHONE (OUTPATIENT)
Dept: PSYCHIATRY | Facility: CLINIC | Age: 49
End: 2024-04-23

## 2024-04-23 NOTE — TELEPHONE ENCOUNTER
Patient calling back to set up an appointment for a physical. Please call him back tomorrow to get this scheduled.

## 2024-04-24 ENCOUNTER — TELEPHONE (OUTPATIENT)
Dept: PSYCHIATRY | Facility: CLINIC | Age: 49
End: 2024-04-24

## 2024-04-24 ENCOUNTER — TELEPHONE (OUTPATIENT)
Age: 49
End: 2024-04-24

## 2024-04-24 NOTE — TELEPHONE ENCOUNTER
Patient called enquiring about having paperwork filled out for short term disability for his job.  He is not sure if he would need another appointment or if Dr Catherine would be able to fill this out based on his OV 4/19.  He would also like to discuss with provider how his newly prescribed medications are working.    Please advise.

## 2024-04-24 NOTE — TELEPHONE ENCOUNTER
"Behavioral Health Outpatient Intake Questions    Referred By   :     Please advise interviewee that they need to answer all questions truthfully to allow for best care, and any misrepresentations of information may affect their ability to be seen at this clinic   => Was this discussed? Yes     If Minor Child (under age 18)    Who is/are the legal guardian(s) of the child?     Is there a custody agreement? No     If \"YES\"- Custody orders must be obtained prior to scheduling the first appointment  In addition, Consent to Treatment must be signed by all legal guardians prior to scheduling the first appointment    If \"NO\"- Consent to Treatment must be signed by all legal guardians prior to scheduling the first appointment    Behavioral Health Outpatient Intake History -     Presenting Problem (in patient's own words):   Anxiety & Depression    Are there any communication barriers for this patient?     No                                               If yes, please describe barriers:   If there is a unique situation, please refer to Taurus Steward/Svetlana Mendoza for final determination.    Are you taking any psychiatric medications? No     If \"YES\" -What are they      If \"YES\" -Who prescribes?     Has the Patient previously received outpatient Talk Therapy or Medication Management from [facility]  No        If \"YES\"- When, Where and with Whom?         If \"NO\" -Has Patient received these services elsewhere?       If \"YES\" -When, Where, and with Whom?    Has the Patient abused alcohol or other substances in the last 6 months ? No       If \"YES\" -What substance, How much, How often?     If illegal substance: Refer to [facility] (for [place]) or [facility].   If Alcohol in excess of 10 drinks per week:  Refer to [facility] (for [place]) or [facility]    Legal History-     Is this treatment court ordered? No   If \"yes \"send to :  Talk Therapy : Send to Taurus Mendoza for final determination   Med Management: " "Send to Dr Tatum for final determination     Has the Patient been convicted of a felony?  NO   If \"Yes\" send to -When, What?  Talk Therapy : Send to Taurus Steward/Svetlana Mendoza for final determination   Med Management: Send to Dr Tatum for final determination     ACCEPTED as a patient Yes  If \"Yes\" Appointment Date: 5/31 @ 11 with Kelly    Referred Elsewhere? No  If “Yes” - (Where? Ex: [facility], [facility], [facility], [facility], etc.)       Name of Insurance Co:[employer] - Employer  Insurance ID# [id number]   Insurance Phone # pt. said the info in the system has the number  If ins is primary or secondary? Primary    "

## 2024-04-25 ENCOUNTER — TELEPHONE (OUTPATIENT)
Age: 49
End: 2024-04-25

## 2024-04-25 DIAGNOSIS — F41.1 GAD (GENERALIZED ANXIETY DISORDER): ICD-10-CM

## 2024-04-25 RX ORDER — HYDROXYZINE HYDROCHLORIDE 10 MG/1
10 TABLET, FILM COATED ORAL EVERY 6 HOURS PRN
Qty: 30 TABLET | Refills: 0 | Status: SHIPPED | OUTPATIENT
Start: 2024-04-25

## 2024-04-25 NOTE — TELEPHONE ENCOUNTER
Please review documented message, can patient be scheduled for virtual to discuss medication issue?

## 2024-04-25 NOTE — TELEPHONE ENCOUNTER
Decreased patient's hydroxyzine dose to 10 mg as needed (sent to pharmacy).  Advise patient to trial this lower dosage as needed first.  If not or patient is experiencing similar side effects on lower dosage, can schedule visit.  Thank you.

## 2024-04-25 NOTE — TELEPHONE ENCOUNTER
Can discuss further with him during his physical on 5/13 or patient can move up his appointment if needed.  Please advise patient to obtain blood work prior to appointment.  Thank you.

## 2024-04-25 NOTE — TELEPHONE ENCOUNTER
Patient called regarding his Vistaril medication.  Patient is experiencing a side effect, drowsiness the next day, and patient would like to speak to Dr. Catherine in regards to changing this medication.     Additionally, the patient has disability paperwork that he needs completed. Relayed Dr. Catherine's message about discussing this at the patients physical and advised patient to completed his bloodwork prior to his appointment.  Patient did move his physical appointment to 5/6 and is aware he needs to complete the lab work.    Please return the patients call in regards to the medication.

## 2024-04-27 LAB
25(OH)D3 SERPL-MCNC: 33 NG/ML (ref 30–100)
HBA1C MFR BLD: 5.2 % OF TOTAL HGB
HCV AB SERPL QL IA: NORMAL
HIV 1+2 AB+HIV1 P24 AG SERPL QL IA: NORMAL

## 2024-04-30 ENCOUNTER — TELEPHONE (OUTPATIENT)
Age: 49
End: 2024-04-30

## 2024-04-30 NOTE — TELEPHONE ENCOUNTER
Patient is asking about leave from work paperwork that he is bringing in to have Dr. Catherine complete.  He has an appointment with Dr. Catherine on 5/6/24 and is asking if that would be the day that his leave from work would start. Patient states that he plans on starting his work leave on 5/6.

## 2024-05-01 LAB
CHOLEST SERPL-MCNC: 201 MG/DL
CHOLEST/HDLC SERPL: 4.3 (CALC)
HDLC SERPL-MCNC: 47 MG/DL
LDLC SERPL CALC-MCNC: 139 MG/DL (CALC)
NONHDLC SERPL-MCNC: 154 MG/DL (CALC)
TRIGL SERPL-MCNC: 60 MG/DL

## 2024-05-06 ENCOUNTER — OFFICE VISIT (OUTPATIENT)
Dept: FAMILY MEDICINE CLINIC | Facility: CLINIC | Age: 49
End: 2024-05-06
Payer: COMMERCIAL

## 2024-05-06 VITALS
SYSTOLIC BLOOD PRESSURE: 138 MMHG | WEIGHT: 153 LBS | HEIGHT: 72 IN | OXYGEN SATURATION: 98 % | TEMPERATURE: 97.6 F | DIASTOLIC BLOOD PRESSURE: 76 MMHG | BODY MASS INDEX: 20.72 KG/M2 | HEART RATE: 120 BPM | RESPIRATION RATE: 16 BRPM

## 2024-05-06 DIAGNOSIS — F41.1 GAD (GENERALIZED ANXIETY DISORDER): ICD-10-CM

## 2024-05-06 DIAGNOSIS — Z71.3 DIETARY COUNSELING: ICD-10-CM

## 2024-05-06 DIAGNOSIS — Z00.00 ANNUAL PHYSICAL EXAM: Primary | ICD-10-CM

## 2024-05-06 DIAGNOSIS — R06.02 SHORTNESS OF BREATH ON EXERTION: ICD-10-CM

## 2024-05-06 DIAGNOSIS — F32.2 MODERATELY SEVERE MAJOR DEPRESSION (HCC): ICD-10-CM

## 2024-05-06 DIAGNOSIS — Z71.82 EXERCISE COUNSELING: ICD-10-CM

## 2024-05-06 DIAGNOSIS — R07.9 INTERMITTENT CHEST PAIN: ICD-10-CM

## 2024-05-06 DIAGNOSIS — E78.00 HYPERCHOLESTEREMIA: ICD-10-CM

## 2024-05-06 PROCEDURE — 99214 OFFICE O/P EST MOD 30 MIN: CPT | Performed by: STUDENT IN AN ORGANIZED HEALTH CARE EDUCATION/TRAINING PROGRAM

## 2024-05-06 PROCEDURE — 99396 PREV VISIT EST AGE 40-64: CPT | Performed by: STUDENT IN AN ORGANIZED HEALTH CARE EDUCATION/TRAINING PROGRAM

## 2024-05-06 NOTE — ASSESSMENT & PLAN NOTE
Patient would still like to avoid medication if possible given side effects experienced while on SSRIs.  Patient does have upcoming appointment with behavioral health at the end of the month.  Patient denies any suicidal ideation and advised patient to reach out to office immediately if he were to feel his symptoms are worsening.  Patient expressed understanding.

## 2024-05-06 NOTE — ASSESSMENT & PLAN NOTE
See major depression  Likely explaining elevated heart rate.  Patient states that he felt groggy the day after using Atarax medication and has avoided using it.

## 2024-05-06 NOTE — ASSESSMENT & PLAN NOTE
Counseled patient on dietary and exercise modifications.  Patient expressed understanding, will continue to monitor.

## 2024-05-06 NOTE — PATIENT INSTRUCTIONS
Wellness Visit for Adults   AMBULATORY CARE:   A wellness visit  is when you see your healthcare provider to get screened for health problems. Your healthcare provider will also give you advice on how to stay healthy. Write down your questions so you remember to ask them. Ask your healthcare provider how often you should have a wellness visit.  What happens at a wellness visit:  Your healthcare provider will ask about your health, and your family history of health problems. This includes high blood pressure, heart disease, and cancer. He or she will ask if you have symptoms that concern you, if you smoke, and about your mood. You may also be asked about your intake of medicines, supplements, food, and alcohol. Any of the following may be done:  Your weight  will be checked. Your height may also be checked so your body mass index (BMI) can be calculated. Your BMI shows if you are at a healthy weight.    Your blood pressure  and heart rate will be checked. Your temperature may also be checked.    Blood and urine tests  may be done. Blood tests may be done to check your cholesterol levels. Abnormal cholesterol levels increase your risk for heart disease and stroke. You may also need a blood or urine test to check for diabetes if you are at increased risk. Urine tests may be done to look for signs of an infection or kidney disease.    A physical exam  includes checking your heartbeat and lungs with a stethoscope. Your healthcare provider may also check your skin to look for sun damage.    Screening tests  may be recommended. A screening test is done to check for diseases that may not cause symptoms. The screening tests you may need depend on your age, gender, family history, and lifestyle habits. For example, colorectal screening may be recommended if you are 50 years old or older.    Screening tests you need if you are a woman:   A Pap smear  is used to screen for cervical cancer. Pap smears are usually done every 3 to  5 years depending on your age. You may need them more often if you have had abnormal Pap smear test results in the past. Ask your healthcare provider how often you should have a Pap smear.    A mammogram  is an x-ray of your breasts to screen for breast cancer. Experts recommend mammograms every 2 years starting at age 50 years. You may need a mammogram at age 49 years or younger if you have an increased risk for breast cancer. Talk to your healthcare provider about when you should start having mammograms and how often you need them.    Vaccines you may need:   Get an influenza vaccine  every year. The influenza vaccine protects you from the flu. Several types of viruses cause the flu. The viruses change over time, so new vaccines are made each year.    Get a tetanus-diphtheria (Td) booster vaccine  every 10 years. This vaccine protects you against tetanus and diphtheria. Tetanus is a severe infection that may cause painful muscle spasms and lockjaw. Diphtheria is a severe bacterial infection that causes a thick covering in the back of your mouth and throat.    Get a human papillomavirus (HPV) vaccine  if you are female and aged 19 to 26 or male 19 to 21 and never received it. This vaccine protects you from HPV infection. HPV is the most common infection spread by sexual contact. HPV may also cause vaginal, penile, and anal cancers.    Get a pneumococcal vaccine  if you are aged 65 years or older. The pneumococcal vaccine is an injection given to protect you from pneumococcal disease. Pneumococcal disease is an infection caused by pneumococcal bacteria. The infection may cause pneumonia, meningitis, or an ear infection.    Get a shingles vaccine  if you are 60 or older, even if you have had shingles before. The shingles vaccine is an injection to protect you from the varicella-zoster virus. This is the same virus that causes chickenpox. Shingles is a painful rash that develops in people who had chickenpox or have  been exposed to the virus.    How to eat healthy:  My Plate is a model for planning healthy meals. It shows the types and amounts of foods that should go on your plate. Fruits and vegetables make up about half of your plate, and grains and protein make up the other half. A serving of dairy is included on the side of your plate. The amount of calories and serving sizes you need depends on your age, gender, weight, and height. Examples of healthy foods are listed below:  Eat a variety of vegetables  such as dark green, red, and orange vegetables. You can also include canned vegetables low in sodium (salt) and frozen vegetables without added butter or sauces.    Eat a variety of fresh fruits , canned fruit in 100% juice, frozen fruit, and dried fruit.    Include whole grains.  At least half of the grains you eat should be whole grains. Examples include whole-wheat bread, wheat pasta, brown rice, and whole-grain cereals such as oatmeal.    Eat a variety of protein foods such as seafood (fish and shellfish), lean meat, and poultry without skin (turkey and chicken). Examples of lean meats include pork leg, shoulder, or tenderloin, and beef round, sirloin, tenderloin, and extra lean ground beef. Other protein foods include eggs and egg substitutes, beans, peas, soy products, nuts, and seeds.    Choose low-fat dairy products such as skim or 1% milk or low-fat yogurt, cheese, and cottage cheese.    Limit unhealthy fats  such as butter, hard margarine, and shortening.       Exercise:  Exercise at least 30 minutes per day on most days of the week. Some examples of exercise include walking, biking, dancing, and swimming. You can also fit in more physical activity by taking the stairs instead of the elevator or parking farther away from stores. Include muscle strengthening activities 2 days each week. Regular exercise provides many health benefits. It helps you manage your weight, and decreases your risk for type 2 diabetes,  heart disease, stroke, and high blood pressure. Exercise can also help improve your mood. Ask your healthcare provider about the best exercise plan for you.       General health and safety guidelines:   Do not smoke.  Nicotine and other chemicals in cigarettes and cigars can cause lung damage. Ask your healthcare provider for information if you currently smoke and need help to quit. E-cigarettes or smokeless tobacco still contain nicotine. Talk to your healthcare provider before you use these products.    Limit alcohol.  A drink of alcohol is 12 ounces of beer, 5 ounces of wine, or 1½ ounces of liquor.    Lose weight, if needed.  Being overweight increases your risk of certain health conditions. These include heart disease, high blood pressure, type 2 diabetes, and certain types of cancer.    Protect your skin.  Do not sunbathe or use tanning beds. Use sunscreen with a SPF 15 or higher. Apply sunscreen at least 15 minutes before you go outside. Reapply sunscreen every 2 hours. Wear protective clothing, hats, and sunglasses when you are outside.    Drive safely.  Always wear your seatbelt. Make sure everyone in your car wears a seatbelt. A seatbelt can save your life if you are in an accident. Do not use your cell phone when you are driving. This could distract you and cause an accident. Pull over if you need to make a call or send a text message.    Practice safe sex.  Use latex condoms if are sexually active and have more than one partner. Your healthcare provider may recommend screening tests for sexually transmitted infections (STIs).    Wear helmets, lifejackets, and protective gear.  Always wear a helmet when you ride a bike or motorcycle, go skiing, or play sports that could cause a head injury. Wear protective equipment when you play sports. Wear a lifejacket when you are on a boat or doing water sports.    © Copyright [employer] 2023 Information is for End User's use only and may not be sold, redistributed or  otherwise used for commercial purposes.  The above information is an  only. It is not intended as medical advice for individual conditions or treatments. Talk to your doctor, nurse or pharmacist before following any medical regimen to see if it is safe and effective for you.    Cholesterol and Your Health   AMBULATORY CARE:   Cholesterol  is a waxy, fat-like substance. Your body uses cholesterol to make hormones and new cells, and to protect nerves. Cholesterol is made by your body. It also comes from certain foods you eat, such as meat and dairy products. Your healthcare provider can help you set goals for your cholesterol levels. Your provider can help you create a plan to meet your goals.  Cholesterol level goals:  Your cholesterol level goals depend on your risk for heart disease, your age, and your other health conditions. The following are general guidelines:  Total cholesterol  includes low-density lipoprotein (LDL), high-density lipoprotein (HDL), and triglyceride levels. The total cholesterol level should be lower than 200 mg/dL and is best at about 150 mg/dL.    LDL cholesterol  is called bad cholesterol  because it forms plaque in your arteries. As plaque builds up, your arteries become narrow, and less blood flows through. When plaque decreases blood flow to your heart, you may have chest pain. If plaque completely blocks an artery that brings blood to your heart, you may have a heart attack. Plaque can break off and form blood clots. Blood clots may block arteries in your brain and cause a stroke. The level should be less than 130 mg/dL and is best at about 100 mg/dL.         HDL cholesterol  is called good cholesterol  because it helps remove LDL cholesterol from your arteries. It does this by attaching to LDL cholesterol and carrying it to your liver. Your liver breaks down LDL cholesterol so your body can get rid of it. High levels of HDL cholesterol can help prevent a heart attack and  stroke. Low levels of HDL cholesterol can increase your risk for heart disease, heart attack, and stroke. The level should be at least 40 mg/dL in males or at least 50 mg/dL in females.    Triglycerides  are a type of fat that store energy from foods you eat. High levels of triglycerides also cause plaque buildup. This can increase your risk for a heart attack or stroke. If your triglyceride level is high, your LDL cholesterol level may also be high. The level should be less than 150 mg/dL.    Any of the following can increase your risk for high cholesterol:   Smoking or drinking large amounts of alcohol    Having overweight or obesity, or not getting enough exercise    A medical condition such as hypertension (high blood pressure) or diabetes    A family history of high cholesterol    Age older than 65    What you need to know about having your cholesterol levels checked:  Adults 20 to 45 years of age should have their cholesterol levels checked every 4 to 6 years. Adults 45 years or older should have their cholesterol checked every 1 to 2 years. You may need your cholesterol checked more often, or at a younger age, if you have risk factors for heart disease. You may also need to have your cholesterol checked more often if you have other health conditions, such as diabetes. Blood tests are used to check cholesterol levels. Blood tests measure your levels of triglycerides, LDL cholesterol, and HDL cholesterol.  How healthy fats affect your cholesterol levels:  Healthy fats, also called unsaturated fats, help lower LDL cholesterol and triglyceride levels. Healthy fats include the following:  Monounsaturated fats  are found in foods such as olive oil, canola oil, avocado, nuts, and olives.    Polyunsaturated fats,  such as omega 3 fats, are found in fish, such as salmon, trout, and tuna. They can also be found in plant foods such as flaxseed, walnuts, and soybeans.    How unhealthy fats affect your cholesterol levels:   Unhealthy fats increase LDL cholesterol and triglyceride levels. They are found in foods high in cholesterol, saturated fat, and trans fat:  Cholesterol  is found in eggs, dairy, and meat.    Saturated fat  is found in butter, cheese, ice cream, whole milk, and coconut oil. Saturated fat is also found in meat, such as sausage, hot dogs, and bologna.    Trans fat  is found in liquid oils and is used in fried and baked foods. Foods that contain trans fats include chips, crackers, muffins, sweet rolls, microwave popcorn, and cookies.    Treatment  for high cholesterol will also decrease your risk of heart disease, heart attack, and stroke. Treatment may include any of the following:  Lifestyle changes  may include food, exercise, weight loss, and quitting smoking. You may also need to decrease the amount of alcohol you drink. Your healthcare provider will want you to start with lifestyle changes. Other treatment may be added if lifestyle changes are not enough. Your healthcare provider may recommend you work with a team to manage hyperlipidemia. The team may include medical experts such as a dietitian, an exercise or physical therapist, and a behavior therapist. Your family members may be included in helping you create lifestyle changes.    Medicines  may be given to lower your LDL cholesterol, triglyceride levels, or total cholesterol level. You may need medicines to lower your cholesterol if any of the following is true:    You have a history of stroke, TIA, unstable angina, or a heart attack.    Your LDL cholesterol level is 190 mg/dL or higher.    You are age 40 to 75 years, have diabetes or heart disease risk factors, and your LDL cholesterol is 70 mg/dL or higher.    Supplements  include fish oil, red yeast rice, and garlic. Fish oil may help lower your triglyceride and LDL cholesterol levels. It may also increase your HDL cholesterol level. Red yeast rice may help decrease your total cholesterol level and LDL  cholesterol level. Garlic may help lower your total cholesterol level. Do not take any supplements without talking to your healthcare provider.    Food changes you can make to lower your cholesterol levels:  A dietitian can help you create a healthy eating plan. Your dietitian can show you how to read food labels and choose foods low in saturated fat, trans fats, and cholesterol.     Decrease the total amount of fat you eat.  Choose lean meats, fat-free or 1% fat milk, and low-fat dairy products, such as yogurt and cheese. Try to limit or avoid red meats. Limit or do not eat fried foods or baked goods, such as cookies.    Replace unhealthy fats with healthy fats.  Cook foods in olive oil or canola oil. Choose soft margarines that are low in saturated fat and trans fat. Seeds, nuts, and avocados are other examples of healthy fats.    Eat foods with omega-3 fats.  Examples include salmon, tuna, mackerel, walnuts, and flaxseed. Eat fish 2 times per week. Pregnant women should not eat fish that have high levels of mercury, such as shark, swordfish, and peg mackerel.         Increase the amount of high-fiber foods you eat.  High-fiber foods can help lower your LDL cholesterol. Aim to get between 20 and 30 grams of fiber each day. Fruits and vegetables are high in fiber. Eat at least 5 servings each day. Other high-fiber foods are whole-grain or whole-wheat breads, pastas, or cereals, and brown rice. Eat 3 ounces of whole-grain foods each day. Increase fiber slowly. You may have abdominal discomfort, bloating, and gas if you add fiber to your diet too quickly.         Eat healthy protein foods.  Examples include low-fat dairy products, skinless chicken and turkey, fish, and nuts.    Limit foods and drinks that are high in sugar.  Your dietitian or healthcare provider can help you create daily limits for high-sugar foods and drinks. The limit may be lower if you have diabetes or another health condition. Limits can also  help you lose weight if needed.  Lifestyle changes you can make to lower your cholesterol levels:   Maintain a healthy weight.  Ask your healthcare provider what a healthy weight is for you. Ask your provider to help you create a weight loss plan if needed. Weight loss can decrease your total cholesterol and triglyceride levels. Weight loss may also help keep your blood pressure at a healthy level.    Be physically active throughout the day.  Physical activity, such as exercise, can help lower your total cholesterol level and maintain a healthy weight. Physical activity can also help increase your HDL cholesterol level. Work with your healthcare provider to create an program that is right for you. Get at least 30 to 40 minutes of moderate physical activity most days of the week. Examples of exercise include brisk walking, swimming, or biking. Also include strength training at least 2 times each week. Your healthcare providers can help you create a physical activity plan.            Do not smoke.  Nicotine and other chemicals in cigarettes and cigars can raise your cholesterol levels. Ask your healthcare provider for information if you currently smoke and need help to quit. E-cigarettes or smokeless tobacco still contain nicotine. Talk to your healthcare provider before you use these products.         Limit or do not drink alcohol.  Alcohol can increase your triglyceride levels. Ask your healthcare provider before you drink alcohol. Ask how much is okay for you to drink in 24 hours or 1 week.    Follow up with your doctor as directed:  Write down your questions so you remember to ask them during your visits.  © Copyright [employer] 2023 Information is for End User's use only and may not be sold, redistributed or otherwise used for commercial purposes.  The above information is an  only. It is not intended as medical advice for individual conditions or treatments. Talk to your doctor, nurse or pharmacist  before following any medical regimen to see if it is safe and effective for you.

## 2024-05-06 NOTE — PROGRESS NOTES
ADULT ANNUAL PHYSICAL  [facility]    NAME: Devon Rollins Jr.  AGE: 49 y.o. SEX: male  : 1975     DATE: 2024     Assessment and Plan:     Problem List Items Addressed This Visit       TIMOTHY (generalized anxiety disorder)     See major depression  Likely explaining elevated heart rate.  Patient states that he felt groggy the day after using Atarax medication and has avoided using it.         Hypercholesteremia     Counseled patient on dietary and exercise modifications.  Patient expressed understanding, will continue to monitor.         Moderately severe major depression (HCC)     Patient would still like to avoid medication if possible given side effects experienced while on SSRIs.  Patient does have upcoming appointment with behavioral health at the end of the month.  Patient denies any suicidal ideation and advised patient to reach out to office immediately if he were to feel his symptoms are worsening.  Patient expressed understanding.          Other Visit Diagnoses       Annual physical exam    -  Primary    Shortness of breath on exertion        Relevant Orders    Ambulatory Referral to Cardiology    Intermittent chest pain        Relevant Orders    Ambulatory Referral to Cardiology    Dietary counseling        Exercise counseling              Explained to patient that shortness of breath and chest pain symptoms likely attributed to anxiety, however patient would like workup given family history of sudden MI at younger age.  Patient did have a stress test about 5 to 8 years ago that was negative.  Referral to cardiology placed.  Patient to return for follow-up in 1 month.      Immunizations and preventive care screenings were discussed with patient today. Appropriate education was printed on patient's after visit summary.    Discussed risks and benefits of prostate cancer screening. We discussed the controversial history of  PSA screening for prostate cancer in the [place] as well as the risk of over detection and over treatment of prostate cancer by way of PSA screening.  The patient understands that PSA blood testing is an imperfect way to screen for prostate cancer and that elevated PSA levels in the blood may also be caused by infection, inflammation, prostatic trauma or manipulation, urological procedures, or by benign prostatic enlargement.    The role of the digital rectal examination in prostate cancer screening was also discussed and I discussed with him that there is large interobserver variability in the findings of digital rectal examination.    Counseling:  Alcohol/drug use: discussed moderation in alcohol intake, the recommendations for healthy alcohol use, and avoidance of illicit drug use.  Dental Health: discussed importance of regular tooth brushing, flossing, and dental visits.  Exercise: the importance of regular exercise/physical activity was discussed. Recommend exercise 3-5 times per week for at least 30 minutes.          Return in 1 month (on 6/6/2024).     Chief Complaint:     Chief Complaint   Patient presents with    Physical Exam     No concerns at time of rooming.     Anxiety     Patient would like to discuss anxiety medication. Patient states the ones he has tried aren't working and would like to see if there is an alternatives.       History of Present Illness:     Adult Annual Physical   Patient here for a comprehensive physical exam. The patient reports problems - continues to deal with anxiety/depression .    Diet and Physical Activity  Diet/Nutrition: well balanced diet and consuming 3-5 servings of fruits/vegetables daily.   Exercise: walking, moderate cardiovascular exercise, 3-4 times a week on average, and 30-60 minutes on average.      Depression Screening  PHQ-2/9 Depression Screening    Little interest or pleasure in doing things: 3 - nearly every day  Feeling down, depressed, or hopeless:  3 - nearly every day  Trouble falling or staying asleep, or sleeping too much: 3 - nearly every day  Feeling tired or having little energy: 2 - more than half the days  Poor appetite or overeatin - more than half the days  Feeling bad about yourself - or that you are a failure or have let yourself or your family down: 3 - nearly every day  Trouble concentrating on things, such as reading the newspaper or watching television: 2 - more than half the days  Moving or speaking so slowly that other people could have noticed. Or the opposite - being so fidgety or restless that you have been moving around a lot more than usual: 2 - more than half the days  Thoughts that you would be better off dead, or of hurting yourself in some way: 0 - not at all       General Health  Sleep: sleeps poorly and gets 4-6 hours of sleep on average.   Hearing: normal - bilateral.  Vision: no vision problems, most recent eye exam >1 year ago, wears glasses, and lazy eye .   Dental: regular dental visits and brushes teeth twice daily.        Health  Symptoms include: none       Review of Systems:     Review of Systems   Constitutional:  Negative for chills and fever.   HENT:  Negative for sore throat.    Respiratory:  Negative for cough and shortness of breath.    Cardiovascular:  Negative for chest pain and palpitations.   Gastrointestinal:  Positive for blood in stool (one episode) and constipation. Negative for abdominal pain, diarrhea, nausea and vomiting.   Genitourinary:  Negative for difficulty urinating and dysuria.   Neurological:  Negative for dizziness and headaches.      Past Medical History:     Past Medical History:   Diagnosis Date    Allergic     Arthritis       Past Surgical History:     Past Surgical History:   Procedure Laterality Date    KNEE ARTHROSCOPY      SHOULDER ARTHROSCOPY        Family History:     Family History   Problem Relation Age of Onset    Stomach cancer Cousin       Social History:     Social History      Socioeconomic History    Marital status: Single     Spouse name: None    Number of children: None    Years of education: None    Highest education level: None   Occupational History    Occupation: employed   Tobacco Use    Smoking status: Never     Passive exposure: Never    Smokeless tobacco: Never   Vaping Use    Vaping status: Never Used   Substance and Sexual Activity    Alcohol use: Yes     Comment: social    Drug use: Never    Sexual activity: Not Currently   Other Topics Concern    None   Social History Narrative    None     Social Determinants of Health     Financial Resource Strain: Not on file   Food Insecurity: Not on file   Transportation Needs: Not on file   Physical Activity: Not on file   Stress: Not on file   Social Connections: Not on file   Intimate Partner Violence: Not on file   Housing Stability: Not on file      Current Medications:     Current Outpatient Medications   Medication Sig Dispense Refill    fish oil-omega-3 fatty acids 1000 MG capsule Take by mouth      ibuprofen (MOTRIN) 200 mg tablet 200 mg      Magnesium 100 MG CAPS Take by mouth      Multiple Vitamin (Tab-A-Constantino) TABS Take 1 tablet by mouth daily      polyethylene glycol (MIRALAX) 17 g packet Take 17 g by mouth daily      hydrOXYzine HCL (ATARAX) 10 mg tablet Take 1 tablet (10 mg total) by mouth every 6 (six) hours as needed for anxiety (Patient not taking: Reported on 5/6/2024) 30 tablet 0    polyethylene glycol (GOLYTELY) 4000 mL solution Take 4,000 mL by mouth once for 1 dose Take as directed by office instructions prior to colonoscopy. 4000 mL 0    senna (SENOKOT) 8.6 mg Take 1 tablet (8.6 mg total) by mouth daily at bedtime 30 tablet 0     No current facility-administered medications for this visit.      Allergies:     No Known Allergies   Physical Exam:     /76 (BP Location: Left arm, Patient Position: Sitting, Cuff Size: Standard)   Pulse (!) 120   Temp 97.6 °F (36.4 °C) (Tympanic)   Resp 16   Ht 6' (1.829  m)   Wt 69.4 kg (153 lb)   SpO2 98%   BMI 20.75 kg/m²     Physical Exam  Constitutional:       Appearance: Normal appearance.   HENT:      Head: Normocephalic and atraumatic.      Right Ear: Tympanic membrane and ear canal normal.      Left Ear: Tympanic membrane and ear canal normal.      Nose: Nose normal. No congestion.      Mouth/Throat:      Mouth: Mucous membranes are moist.      Pharynx: Oropharynx is clear.   Eyes:      Extraocular Movements: Extraocular movements intact.      Conjunctiva/sclera: Conjunctivae normal.      Pupils: Pupils are equal, round, and reactive to light.   Cardiovascular:      Rate and Rhythm: Regular rhythm. Tachycardia present.      Heart sounds: Normal heart sounds. No murmur heard.  Pulmonary:      Effort: Pulmonary effort is normal. No respiratory distress.      Breath sounds: Normal breath sounds. No stridor. No wheezing.   Abdominal:      General: Abdomen is flat. Bowel sounds are normal. There is no distension.      Palpations: Abdomen is soft. There is no mass.      Tenderness: There is no abdominal tenderness.      Hernia: No hernia is present.   Musculoskeletal:      Cervical back: Neck supple. No tenderness.      Right lower leg: No edema.      Left lower leg: No edema.   Neurological:      Mental Status: He is alert.          Tomasz Catherine MD  [facility] PRIMARY CARE [facility]

## 2024-05-07 NOTE — TELEPHONE ENCOUNTER
Patient called to confirm receipt of BOB paperwork from [employer]. At this time not visible in Media file - Warm TSF to Stacy Ruth. Clerical -  who confirmed they received the BOB paperwork.     Jose is in the office 2 days a week, Wednesday/Thursdays only.      Patient states the BOB date s/b 5/6/2024, He  discussed with the provider a BOB time of 4 weeks.  He was recently seen in the office however, if the provider has any questions completing the paperwork, please contact the patient.    PRIOR to faxing the completed BOB paperwork, to ([employer]?) - number should be included with the forms, kindly review with the patient first to ensure all is completed

## 2024-05-08 ENCOUNTER — PATIENT MESSAGE (OUTPATIENT)
Dept: GASTROENTEROLOGY | Facility: CLINIC | Age: 49
End: 2024-05-08

## 2024-05-08 NOTE — TELEPHONE ENCOUNTER
Pt called back to check on the BOB paper work status kindly give him a call back to update on the same. Thanks

## 2024-05-08 NOTE — TELEPHONE ENCOUNTER
Patient called to see if the BOB forms were faxed back completed.  I advised that it typically takes longer. I called the office for an update and everyone was helping other patients.  Please call the patient when ppwk is completed.  [phone]

## 2024-05-15 ENCOUNTER — DOCUMENTATION (OUTPATIENT)
Dept: FAMILY MEDICINE CLINIC | Facility: CLINIC | Age: 49
End: 2024-05-15

## 2024-05-15 ENCOUNTER — TELEPHONE (OUTPATIENT)
Dept: FAMILY MEDICINE CLINIC | Facility: CLINIC | Age: 49
End: 2024-05-15

## 2024-05-15 NOTE — TELEPHONE ENCOUNTER
Pt called in requesting to spoke to rosemary stafford she was busy so I spoke with pt he is requesting to change the dates on his fmla paperwork 5/6 thru 6/9 rosemary stafford stated paper work is in dr bin to be signed pt however is confused due to the fact that schuyler hercules has already received his paper work and it was approved pt is requesting a call back from rosemary stafford

## 2024-05-16 ENCOUNTER — TELEPHONE (OUTPATIENT)
Dept: FAMILY MEDICINE CLINIC | Facility: CLINIC | Age: 49
End: 2024-05-16

## 2024-05-16 NOTE — TELEPHONE ENCOUNTER
Spoke with patient today 05/16/24 and make sure of the return to work date and the date he stopped working. A change to to the return date from 06/06/24 to 0609/24.     Patient stated that he has been approved until 06/06/24 but will return on 06/09/24 and OV with Dr. Catherine on 06/10/24.    A copy of the update form was scannned into his chart today 05/16/24.

## 2024-05-16 NOTE — TELEPHONE ENCOUNTER
Patient returned call from rosemary vides. When warm transferring to the office Rosemary Vides had called patient to speak with him.

## 2024-05-20 ENCOUNTER — ANESTHESIA (OUTPATIENT)
Dept: GASTROENTEROLOGY | Facility: HOSPITAL | Age: 49
End: 2024-05-20

## 2024-05-20 ENCOUNTER — ANESTHESIA EVENT (OUTPATIENT)
Dept: GASTROENTEROLOGY | Facility: HOSPITAL | Age: 49
End: 2024-05-20

## 2024-05-20 ENCOUNTER — HOSPITAL ENCOUNTER (OUTPATIENT)
Dept: GASTROENTEROLOGY | Facility: HOSPITAL | Age: 49
Setting detail: OUTPATIENT SURGERY
Discharge: HOME | End: 2024-05-20
Payer: COMMERCIAL

## 2024-05-20 VITALS
OXYGEN SATURATION: 100 % | TEMPERATURE: 98 F | RESPIRATION RATE: 16 BRPM | DIASTOLIC BLOOD PRESSURE: 79 MMHG | SYSTOLIC BLOOD PRESSURE: 110 MMHG | HEART RATE: 91 BPM

## 2024-05-20 DIAGNOSIS — K59.09 CHRONIC CONSTIPATION: ICD-10-CM

## 2024-05-20 DIAGNOSIS — K92.2 LOWER GI BLEEDING: ICD-10-CM

## 2024-05-20 DIAGNOSIS — R14.0 BLOATING: ICD-10-CM

## 2024-05-20 DIAGNOSIS — R19.4 CHANGE IN BOWEL HABIT: ICD-10-CM

## 2024-05-20 PROCEDURE — 88305 TISSUE EXAM BY PATHOLOGIST: CPT | Performed by: PATHOLOGY

## 2024-05-20 PROCEDURE — 45385 COLONOSCOPY W/LESION REMOVAL: CPT | Performed by: INTERNAL MEDICINE

## 2024-05-20 RX ORDER — LIDOCAINE HYDROCHLORIDE 10 MG/ML
INJECTION, SOLUTION EPIDURAL; INFILTRATION; INTRACAUDAL; PERINEURAL AS NEEDED
Status: DISCONTINUED | OUTPATIENT
Start: 2024-05-20 | End: 2024-05-20

## 2024-05-20 RX ORDER — PROPOFOL 10 MG/ML
INJECTION, EMULSION INTRAVENOUS AS NEEDED
Status: DISCONTINUED | OUTPATIENT
Start: 2024-05-20 | End: 2024-05-20

## 2024-05-20 RX ORDER — PROPOFOL 10 MG/ML
INJECTION, EMULSION INTRAVENOUS CONTINUOUS PRN
Status: DISCONTINUED | OUTPATIENT
Start: 2024-05-20 | End: 2024-05-20

## 2024-05-20 RX ORDER — SODIUM CHLORIDE, SODIUM LACTATE, POTASSIUM CHLORIDE, CALCIUM CHLORIDE 600; 310; 30; 20 MG/100ML; MG/100ML; MG/100ML; MG/100ML
125 INJECTION, SOLUTION INTRAVENOUS CONTINUOUS
Status: DISCONTINUED | OUTPATIENT
Start: 2024-05-20 | End: 2024-05-24 | Stop reason: HOSPADM

## 2024-05-20 RX ADMIN — Medication 40 MG: at 10:25

## 2024-05-20 RX ADMIN — PROPOFOL 50 MG: 10 INJECTION, EMULSION INTRAVENOUS at 10:18

## 2024-05-20 RX ADMIN — SODIUM CHLORIDE, SODIUM LACTATE, POTASSIUM CHLORIDE, AND CALCIUM CHLORIDE 125 ML/HR: .6; .31; .03; .02 INJECTION, SOLUTION INTRAVENOUS at 09:15

## 2024-05-20 RX ADMIN — LIDOCAINE HYDROCHLORIDE 50 MG: 10 INJECTION, SOLUTION EPIDURAL; INFILTRATION; INTRACAUDAL; PERINEURAL at 10:16

## 2024-05-20 RX ADMIN — PROPOFOL 100 MG: 10 INJECTION, EMULSION INTRAVENOUS at 10:17

## 2024-05-20 RX ADMIN — PROPOFOL 100 MCG/KG/MIN: 10 INJECTION, EMULSION INTRAVENOUS at 10:17

## 2024-05-20 NOTE — ANESTHESIA PREPROCEDURE EVALUATION
Procedure:  COLONOSCOPY    Relevant Problems   CARDIO   (+) Hypercholesteremia      NEURO/PSYCH   (+) TIMOTHY (generalized anxiety disorder)   (+) Moderately severe major depression (HCC)      Other   (+) Other constipation        Physical Exam    Airway    Mallampati score: II  TM Distance: <3 FB  Neck ROM: full     Dental   No notable dental hx     Cardiovascular  Cardiovascular exam normal    Pulmonary  Pulmonary exam normal     Other Findings        Anesthesia Plan  ASA Score- 2     Anesthesia Type- IV sedation with anesthesia with ASA Monitors.         Additional Monitors:     Airway Plan:            Plan Factors-Exercise tolerance (METS): >4 METS.    Chart reviewed.   Existing labs reviewed.     Patient is not a current smoker. Patient not instructed to abstain from smoking on day of procedure. Patient did not smoke on day of surgery.            Induction- intravenous.    Postoperative Plan-         Informed Consent- Anesthetic plan and risks discussed with patient.  I personally reviewed this patient with the CRNA. Discussed and agreed on the Anesthesia Plan with the CRNA..

## 2024-05-20 NOTE — H&P
History and Physical - [facility]  Devon Rollins Jr. 49 y.o. male MRN: [id number]                  HPI: Devon Rollins Jr. is a 49 y.o. year old male who presents for constipation for evaluation of constipation and rectal pain/bleeding. No prior colonoscopy. Patient not currently on AP/AC.      REVIEW OF SYSTEMS: Per the HPI, and otherwise unremarkable.    Historical Information   Past Medical History:   Diagnosis Date    Allergic     Arthritis     Hyperlipidemia      Past Surgical History:   Procedure Laterality Date    KNEE ARTHROSCOPY      SHOULDER ARTHROSCOPY       Social History   Social History     Substance and Sexual Activity   Alcohol Use Yes    Comment: social     Social History     Substance and Sexual Activity   Drug Use Never     Social History     Tobacco Use   Smoking Status Never    Passive exposure: Never   Smokeless Tobacco Never     Family History   Problem Relation Age of Onset    Stomach cancer Cousin        Meds/Allergies       Current Outpatient Medications:     fish oil-omega-3 fatty acids 1000 MG capsule    hydrOXYzine HCL (ATARAX) 10 mg tablet    ibuprofen (MOTRIN) 200 mg tablet    Magnesium 100 MG CAPS    Multiple Vitamin (Tab-A-Constantino) TABS    polyethylene glycol (MIRALAX) 17 g packet    polyethylene glycol (GOLYTELY) 4000 mL solution    senna (SENOKOT) 8.6 mg    Current Facility-Administered Medications:     lactated ringers infusion, 125 mL/hr, Intravenous, Continuous, 125 mL/hr at 05/20/24 0915    No Known Allergies    Objective     /81   Pulse (!) 120   Temp 98.7 °F (37.1 °C) (Temporal)   Resp 16   SpO2 98%       PHYSICAL EXAM    Gen: NAD  Head: NCAT  CV: RRR  CHEST: Clear  ABD: soft, NT/ND  EXT: no edema      ASSESSMENT/PLAN:  This is a 49 y.o. year old male here for colonoscopy, and he is stable and optimized for his procedure.

## 2024-05-20 NOTE — ANESTHESIA POSTPROCEDURE EVALUATION
Post-Op Assessment Note    CV Status:  Stable  Pain Score: 0    Pain management: adequate       Mental Status:  Sleepy   Hydration Status:  Euvolemic   PONV Controlled:  Controlled   Airway Patency:  Patent     Post Op Vitals Reviewed: Yes    No anethesia notable event occurred.    Staff: CRNA               BP 92/56 (05/20/24 1048)    Temp 98.8 °F (37.1 °C) (05/20/24 1048)    Pulse 87 (05/20/24 1048)   Resp 16 (05/20/24 1048)    SpO2 97 % (05/20/24 1048)

## 2024-05-28 ENCOUNTER — APPOINTMENT (OUTPATIENT)
Dept: RADIOLOGY | Facility: MEDICAL CENTER | Age: 49
End: 2024-05-28
Payer: COMMERCIAL

## 2024-05-28 ENCOUNTER — OFFICE VISIT (OUTPATIENT)
Dept: URGENT CARE | Facility: MEDICAL CENTER | Age: 49
End: 2024-05-28
Payer: COMMERCIAL

## 2024-05-28 VITALS
WEIGHT: 150.6 LBS | BODY MASS INDEX: 20.4 KG/M2 | OXYGEN SATURATION: 100 % | TEMPERATURE: 97.3 F | SYSTOLIC BLOOD PRESSURE: 121 MMHG | HEIGHT: 72 IN | DIASTOLIC BLOOD PRESSURE: 77 MMHG | HEART RATE: 70 BPM | RESPIRATION RATE: 20 BRPM

## 2024-05-28 DIAGNOSIS — T14.90XA TRAUMA: ICD-10-CM

## 2024-05-28 DIAGNOSIS — S80.02XA CONTUSION OF LEFT KNEE, INITIAL ENCOUNTER: Primary | ICD-10-CM

## 2024-05-28 PROCEDURE — G0382 LEV 3 HOSP TYPE B ED VISIT: HCPCS | Performed by: ORTHOPAEDIC SURGERY

## 2024-05-28 PROCEDURE — 73564 X-RAY EXAM KNEE 4 OR MORE: CPT

## 2024-05-28 NOTE — PATIENT INSTRUCTIONS
Pain relief:   Tylenol/Motrin   Ice   Voltaren gel    Elevation   Wear knee brace with activity, may remove for rest and hygiene   Referral to orthopedics provided. Please call the [facility] Orthopedic scheduling office at [phone] to schedule your appointment.   Follow up with PCP   Proceed to the ED if symptoms worsen

## 2024-05-28 NOTE — PROGRESS NOTES
[facility]        NAME: Devon Rollins Jr. is a 49 y.o. male  : 1975    MRN: [id number]  DATE: May 29, 2024  TIME: 9:01 AM    Assessment and Plan   Contusion of left knee, initial encounter [S80.02XA]  1. Contusion of left knee, initial encounter  Ambulatory Referral to Orthopedic Surgery    Brace      2. Trauma  XR knee 4+ vw left injury        XR of the left knee reviewed and discussed with the patient, which shows no evidence of fractures or dislocations. Mild degenerative changes along the medial compartment. No bony lesions.     History and exam consistent with a contusion, negative Thessaly and McMurrays on exam, though there is concern as patient has a history of meniscal repairs. Provided patient with a knee brace, referral to orthopedics.     Patient Instructions     Pain relief:   Tylenol/Motrin   Ice   Voltaren gel    Elevation   Wear knee brace with activity, may remove for rest and hygiene   Referral to orthopedics provided. Please call the [facility] Orthopedic scheduling office at [phone] to schedule your appointment.   Follow up with PCP   Proceed to the ED if symptoms worsen     If tests are performed, our office will contact you with results only if changes need to made to the care plan discussed with you at the visit. You can review your full results on [facility] My[facility].    Chief Complaint     Chief Complaint   Patient presents with    Knee Pain     At ~ 1300, Pt fell when his lost his footing climbing down bank to go fishing - striking left knee on rock.  Having increased pain with swelling since then.  Previous meniscus repairs X2 on that same knee         History of Present Illness       49-year-old male presents to the urgent care for evaluation of acute left knee pain.  The patient states earlier today he was out fishing.  While walking down a muddy bank he accidentally slipped and fell forward landing directly onto the left knee.  He notes he also did sustain a  superficial abrasion to his right forearm, though denies any pain at this point.  He notes that there was a bit of swelling and pain that developed around 10 minutes after the fall.  He was able to get up on his own, the noted increase in pain when straightening or bending the knee.  The patient reports a history of 2 meniscal injuries in the left knee over 20 years ago, both surgical possibly repaired.  Patient denies any numbness or tingling.        Review of Systems   Review of Systems   Constitutional:  Negative for chills and fever.   HENT:  Negative for ear pain and sore throat.    Eyes:  Negative for pain and visual disturbance.   Respiratory:  Negative for cough and shortness of breath.    Cardiovascular:  Negative for chest pain and palpitations.   Gastrointestinal:  Negative for abdominal pain and vomiting.   Genitourinary:  Negative for hematuria.   Musculoskeletal:  Positive for arthralgias, gait problem, joint swelling and myalgias. Negative for back pain.   Skin:  Negative for color change and rash.   Neurological:  Negative for dizziness, seizures, syncope, weakness, light-headedness and headaches.   Psychiatric/Behavioral: Negative.     All other systems reviewed and are negative.        Current Medications       Current Outpatient Medications:     fish oil-omega-3 fatty acids 1000 MG capsule, Take by mouth, Disp: , Rfl:     Magnesium 100 MG CAPS, Take by mouth, Disp: , Rfl:     Multiple Vitamin (Tab-A-Constantino) TABS, Take 1 tablet by mouth daily, Disp: , Rfl:     polyethylene glycol (MIRALAX) 17 g packet, Take 17 g by mouth daily, Disp: , Rfl:     hydrOXYzine HCL (ATARAX) 10 mg tablet, Take 1 tablet (10 mg total) by mouth every 6 (six) hours as needed for anxiety (Patient not taking: Reported on 5/28/2024), Disp: 30 tablet, Rfl: 0    ibuprofen (MOTRIN) 200 mg tablet, 200 mg (Patient not taking: Reported on 5/28/2024), Disp: , Rfl:     senna (SENOKOT) 8.6 mg, Take 1 tablet (8.6 mg total) by mouth daily  at bedtime (Patient not taking: Reported on 5/28/2024), Disp: 30 tablet, Rfl: 0    Current Allergies     Allergies as of 05/28/2024    (No Known Allergies)            The following portions of the patient's history were reviewed and updated as appropriate: allergies, current medications, past family history, past medical history, past social history, past surgical history and problem list.     Past Medical History:   Diagnosis Date    Allergic     Arthritis     Hyperlipidemia        Past Surgical History:   Procedure Laterality Date    KNEE ARTHROSCOPY      SHOULDER ARTHROSCOPY         Family History   Problem Relation Age of Onset    Stomach cancer Cousin          Medications have been verified.        Objective   /77   Pulse 70   Temp (!) 97.3 °F (36.3 °C) (Tympanic)   Resp 20   Ht 6' (1.829 m)   Wt 68.3 kg (150 lb 9.6 oz)   SpO2 100%   BMI 20.43 kg/m²        Physical Exam     Physical Exam  Vitals and nursing note reviewed.   Constitutional:       General: He is not in acute distress.     Appearance: Normal appearance. He is not ill-appearing.   HENT:      Head: Normocephalic and atraumatic.      Nose: Nose normal.      Mouth/Throat:      Mouth: Mucous membranes are moist.      Pharynx: Oropharynx is clear.   Eyes:      Extraocular Movements: Extraocular movements intact.      Pupils: Pupils are equal, round, and reactive to light.   Cardiovascular:      Rate and Rhythm: Normal rate.      Pulses: Normal pulses.   Pulmonary:      Effort: Pulmonary effort is normal. No respiratory distress.   Musculoskeletal:      Cervical back: Normal range of motion.      Comments: Left knee:  There is a small effusion palpable.  Mild soft tissue swelling visible.  There is a very small superficial abrasion noted anteriorly just medial to the patellar tendon. No ecchymosis, erythema, warmth.  Patient does admit to palpable tenderness along the medial tibial plateau.  No significant joint line tenderness.  No  tenderness along the patella or quadriceps.  With encouragement the patient is able to demonstrate full active extension and flexion to 130 degrees.  No instability with varus or valgus stress.  5/5 strength resisted flexion/extension.  Negative anterior/posterior drawer.  Negative Francesca's.  Patient is able to perform Thessaly test, noting some mild pain.  Soft lower EXTR and compartments.  Sensation intact light touch distally.  Lower extremity is well-perfused.   Skin:     General: Skin is warm and dry.      Capillary Refill: Capillary refill takes less than 2 seconds.   Neurological:      General: No focal deficit present.      Mental Status: He is alert and oriented to person, place, and time.   Psychiatric:         Mood and Affect: Mood normal.         Behavior: Behavior normal.

## 2024-05-29 ENCOUNTER — OFFICE VISIT (OUTPATIENT)
Dept: OBGYN CLINIC | Facility: CLINIC | Age: 49
End: 2024-05-29
Payer: COMMERCIAL

## 2024-05-29 VITALS
BODY MASS INDEX: 20.32 KG/M2 | WEIGHT: 150 LBS | SYSTOLIC BLOOD PRESSURE: 121 MMHG | HEIGHT: 72 IN | DIASTOLIC BLOOD PRESSURE: 77 MMHG
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
## 2024-05-29 DIAGNOSIS — M25.562 ACUTE PAIN OF LEFT KNEE: ICD-10-CM

## 2024-05-29 DIAGNOSIS — Z98.890 HX OF LEFT KNEE SURGERY: ICD-10-CM

## 2024-05-29 DIAGNOSIS — T14.90XA INJURY: ICD-10-CM

## 2024-05-29 DIAGNOSIS — M25.60 LIMITED JOINT RANGE OF MOTION (ROM): Primary | ICD-10-CM

## 2024-05-29 PROBLEM — S80.02XA CONTUSION OF LEFT KNEE, INITIAL ENCOUNTER: Status: ACTIVE | Noted: 2024-05-29

## 2024-05-29 PROCEDURE — 99203 OFFICE O/P NEW LOW 30 MIN: CPT | Performed by: FAMILY MEDICINE

## 2024-05-29 NOTE — PATIENT INSTRUCTIONS
PRP (Platelet Rich Plasma)    What is PRP?  Platelet?rich plasma is a fraction of your blood which contains a higher concentration of platelets than whole blood. PRP therapy involves the injection of this platelet rich concentrate, rich in growth factors and nutrients, to enhance the natural healing response of an injured tissue.    What are the different orthopedic conditions for which PRP may be used?  PRP therapy may be used for conditions like osteoarthritis (degenerative joint disease), chronic tendinitis or chronic ligament and muscle injuries. Some examples include knee osteoarthritis, lateral (tennis elbow) or medial epicondylitis (golfer's elbow) of elbow, patellar or Achilles tendinitis, plantar fasciitis, chronic ligament sprains, etc.    Is PRP therapy right for you?  If you have tried conventional treatment methods (e.g., physical therapy, oral pain medications, bracing, etc.) and persist with chronic joint, muscle or tendon pain; then PRP therapy may be an option. Since every individual and condition is unique, it's best to be clinically evaluated by your physician to determine if PRP is a good option for you.    What are some contraindications for PRP therapy?  It is best to discuss your medical conditions and medications with your physician prior to receiving PRP therapy. Some medical conditions may not be appropriate for PRP therapy e.g., blood disorders, anemia or low platelet count, immunological problems, cancer, active or prolonged infections, pregnancy, chronic smoking.  Certain medications may lower the efficacy of PRP therapy. Examples include aspirin, NSAID's (Motrin, Advil, Aleve, Mobic/ ibuprofen, naproxen, diclofenac, meloxicam, etc.), blood thinners.     What is the scientific evidence for PRP therapy?  The scientific evidence for PRP is evolving. The position statement from [employer] for [employer], published in 2021 indicates that PRP is effective in reducing pain  and improving function in patients with knee osteoarthritis, particularly in those with mild to moderate disease or younger individuals. Evidence for osteoarthritis of other joints is evolving. There is also some evidence for pain reduction in conditions such as lateral epicondylitis and other tendonitis. The evidence regarding PRP use for muscle or ligament injuries is limited.     What are the risks associated with PRP injection?  PRP injections are minimal risk since it is your own blood. Common risks include transient injection site increased pain, infection risk. You should discuss the potential risks for your PRP injection with your physician since it could vary based on your specific condition and injection site.    What can I do to optimize my PRP therapy?  Pre-procedure:   Discuss with your physician if you need to withhold any medications e.g., blood thinners, pain medications before the procedure.  Stay well hydrated on the day of procedure.  Inform physician of any recent medication change or infection.  If able to, perform some light exercise prior to the procedure. Some studies show improved platelet concentration following physical activity.  Post-procedure:  Avoid/ minimize taking NSAID's and blood thinners as per the direction of your treating physician.  Keep the injection area clean and dry and avoid submerging under water e.g., bathtub, swimming pool, ocean water for 4-5 days. May take a shower the next day.  You may experience some increased discomfort in the area where PRP was injected. This may last from a few days to 2-3 weeks. You can apply local ice for comfort and discuss medication options with your physician for any increased pain.  It is advisable to have someone accompany you on the office visit as you may need help with driving following the injection.   Avoid strenuous exercise of the injected area until symptoms improve and following the guidance of your treating physician/ physical  therapist.  Contact the treating physician immediately if there is significant increase in injection area pain associated with spreading redness, fever, or chills.     How is the procedure performed?  A blood sample is taken from the patient like having a lab test for blood. This sample is then placed in a centrifuge machine which separates the blood into different components. One component, platelet rich plasm (PRP) is carefully extracted from the centrifuged sample. This is then injected in the tissue/joint of concern. Sterile precautions are maintained throughout the procedure. Your physician may use an ultrasound machine to accurately deliver the injection into the tissue/joint of concern.     Do I need to do physical therapy after the PRP injection?  Several studies have demonstrated synergistic effect of physical therapy with PRP injection. Physical therapy after PRP is typically customized based on patient's individual scenario. Discuss with your physician about the need and duration of physical therapy following your PRP injection.    When can I expect improvement of symptoms, and will I need further PRP injections?  Symptom improvement can be variable among individuals. Commonly, most people will start noticing improvement in 6-8 weeks following the injection. If there is partial improvement of symptoms, you may opt for another PRP injection typically after 6-8 weeks.    Insurance coverage/Cost  Most traditional insurance companies currently do not cover the cost of PRP injection as it is not “FDA approved” and considered “FDA cleared”. Since PRP is derived from a patient's own blood, it is not considered a drug by FDA. “FDA clearance” means that a device has been noted to be “substantially equivalent” to a currently marketed device. The cost of injection may vary depending on the type and number of PRP injections. Please confirm from your physician's office the exact cost and mode of payment for your PRP  injection prior to scheduling the procedure. P.R.I.C.E. Treatment   WHAT YOU NEED TO KNOW:   What is P.R.I.C.E. treatment?  P.R.I.C.E. treatment is a 5-step process used to decrease swelling and pain caused by an injury. P.R.I.C.E. stands for protect, rest, ice, compress, and elevate. Start P.R.I.C.E. within 24 to 48 hours of an injury.  How do I use P.R.I.C.E. treatment?       Protect  your injury from more damage. Support the injured area with a brace or splint. Your healthcare provider will tell you how long to use the brace or splint.    Rest  your injured area as directed. You may need to stop using, or keep weight off, the injury for 48 hours or longer. Your healthcare provider may recommend crutches or another device. Return to your usual activities as directed.    Apply ice  on your injured area for 15 to 20 minutes every 4 hours or as directed. Use an ice pack, or put crushed ice in a plastic bag. Cover the bag with a towel before you apply it to your skin. Ice helps prevent tissue damage and decreases swelling and pain.    Compress  (keep pressure on) the injured area. Compression will help decrease swelling and support the injured area. Use an elastic bandage, air stirrup, splint, or sling as directed. If you use an elastic bandage, make sure the bandage is not too tight. You should be able to slip 2 fingers between the bandage and your skin.    Elevate  the injured area above the level of your heart as often as you can. This will help decrease swelling and pain. Prop the injured area on pillows or blankets to keep it elevated comfortably.  When should I seek immediate care?   Your pain is severe.    You have severe swelling or deformity.    You have numbness in the injured area.    When should I call my doctor?   Your pain and swelling do not go away after a few days.    You have questions or concerns about your condition or care.    CARE AGREEMENT:   You have the right to help plan your care. Learn  about your health condition and how it may be treated. Discuss treatment options with your healthcare providers to decide what care you want to receive. You always have the right to refuse treatment. The above information is an  only. It is not intended as medical advice for individual conditions or treatments. Talk to your doctor, nurse or pharmacist before following any medical regimen to see if it is safe and effective for you.  © Copyright [employer] 2023 Information is for End User's use only and may not be sold, redistributed or otherwise used for commercial purposes.

## 2024-05-29 NOTE — PROGRESS NOTES
Assessment:     1. Limited joint range of motion (ROM)        2. Acute pain of left knee  Ambulatory Referral to Orthopedic Surgery      3. Injury        4. Fall, initial encounter        5. Hx of left knee surgery          No orders of the defined types were placed in this encounter.       Impression:   Left knee pain likely secondary to potential meniscus pathology.    Date of injury: 05/28/2024  Mechanism of injury: Fall / twisting injury  Follow-up from injury 1 day    Conservative Management   We discussed different treatment options:  Reviewed care now documentation completed on 05/28/2024.  Treatment plan consisted of x-rays.  And referral to Ortho/sports medicine.  Ice or Heat Therapy as needed 1-2 times daily for 10-20 minutes. As tolerated.   Over the counter Tylenol and/or NSAIDs  as needed based off your Past Medical Hx. Please follow product label for dosing and maximum limits.    Formal Handout provided on General Information of PRICE  Please range joint through gentle range of motion as tolerated.   May continue with bracing.         Imaging   Reviewed prior xrays obtain in Urgent or Emergency Department. These were reviewed in office with patient today.   05/28/2024 left knee x-ray: No acute osseous abnormality.  There is mild osteoarthritis  Pending left knee MRI for further evaluation of meniscus.    Procedure  No Injection performed today. May consider future corticosteroid injection depending on clinical exam/imaging.    Shared decision making, patient agreeable to plan.      Return for Follow up after completion of advanced imaging.    HPI:   Devon CAROL Rollins Jr. is a 49 y.o. male  who presents for evaluation of   Chief Complaint   Patient presents with    Left Knee - Swelling, Pain       Occupation: job entails: sitting   Injury Related: Yes, Date of Injury: 05/28/2024    Onset/Mechanism: Was fishing and fell down. Twisted knee. Injury multiple areas. Left knee pain.   Location: medial joint  line.  Severity: Current severity: 3-4/10. Max severity: 8/10.  Pain described as:stiffness  Radiation: denies.  Provocative: knee flexion and knee extension .  Associated symptoms: swelling. Limited ROM    Denies any hx of fracture of affected limb.   HX of surgery of affected limb. Completed: 2001 and 2013 mensicus      Summary of treatment to-date:   Rest   Brace   ICE therapy    NSAIDs      Following History Reviewed and Updated     Past Medical History:   Diagnosis Date    Allergic     Arthritis     Hyperlipidemia      Past Surgical History:   Procedure Laterality Date    KNEE ARTHROSCOPY      SHOULDER ARTHROSCOPY       Family History   Problem Relation Age of Onset    Stomach cancer Cousin        Social History     Substance and Sexual Activity   Alcohol Use Yes    Comment: social     Social History     Substance and Sexual Activity   Drug Use Never     Social History     Tobacco Use   Smoking Status Never    Passive exposure: Never   Smokeless Tobacco Never       Social Determinants of Health     Tobacco Use: Low Risk  (5/29/2024)    Patient History     Smoking Tobacco Use: Never     Smokeless Tobacco Use: Never     Passive Exposure: Never   Alcohol Use: Not on file   Financial Resource Strain: Not on file   Food Insecurity: Not on file   Transportation Needs: Not on file   Physical Activity: Not on file   Stress: Not on file   Social Connections: Not on file   Intimate Partner Violence: Not on file   Depression: At risk (4/8/2019)    [facility]    PHQ-2     PHQ-2 Score: 4   Housing Stability: Not on file   Utilities: Not on file   Health Literacy: Not on file        No Known Allergies    Review of Systems      Review of Systems     Review of Systems   Constitutional: Negative for chills and fever.   HENT: Negative for drooling and sneezing.    Eyes: Negative for redness.   Respiratory: Negative for cough and wheezing.     Gastrointestinal: Negative for vomiting.   Psychiatric/Behavioral: Negative for behavioral problems. The patient is not nervous/anxious.      All other systems negative.   Physical Exam   Physical Exam    Vitals and nursing note reviewed.  Constitutional:   Appearance. Normal Appearance.  /77   Ht 6' (1.829 m)   Wt 68 kg (150 lb)   BMI 20.34 kg/m²     Body mass index is 20.34 kg/m².   HENT:  Head: Atraumatic.  Nose: Nose normal  Eyes: Conjunctiva/sclera: Conjunctivae normal.  Cardiovascular:   Rate and Rhythm: Bilateral equal distal pulses  Pulmonary:   Effort: Pulmonary effort is normal  Skin:   General: Skin is warm and dry.  Neurological:   General: No focal deficit present.  Mental Status: Alert and oriented to person, place, and time.   Psychiatric:   Mood and Affect: mood normal.  Behavior: Behavior normal     Musculoskeletal Exam     Ortho Exam   Left Knee:     Inspection:  Erythema Bruising Effusion Muscle atrophy Retracted muscle   Negative positive Minimal  Negative Negative     Palpation:  Increased warmth Crepitus Palpable muscle depression   Negative Negative  Negative     Tenderness:  Quadriceps tendon Patella Patellar tendon  Joint line   Neg. Neg. Neg. Medial and lateral joint line .        Tibial tubercle Karina's tubercle Pes anserine bursa Posterior knee   Neg. Neg. Neg. Neg.       Biceps femoris Semimembranosus/semitendinosis Popliteus tendon Fibular head   Neg. Neg. Neg. Neg.       Bilateral Range of Motion:  Active flexion Passive flexion   (normal 135 degrees) (normal 135 degrees)   Discomfort at 90 degrees      Active extension Passive extension   (normal 0 degrees) (normal 0 degrees)   Discomfort, limited end range         Bilateral strength:  Seated hip flexion Seated knee flexion Seated knee extension   5/5 4/5 with discomfort   4/5 with discomfort     Seated great toe extension Seated ankle dorsiflexion Seated ankle plantarflexion   5/5 5/5 5/5     Seated hip adduction Seated  "hip abduction Prone knee flexion            Ligament testing:  Anterior cruciate ligament (ACL)  Posterior cruciate ligament (PCL) Medial Collateral Ligament (MCL)  Lateral Collateral Ligament (LCL):   Lachman's Posterior drawer's Valgus Varus   Negative for laxity Negative for laxity Negative for laxity Negative for laxity     Meniscal testing:  Medial Francesca Lateral Francesca Apley's Thessaly's Bounce home test   positive Negative N/A positive N/A     Special tests:  Ely's test: Rolando test Ketan's test      Rectus femoris: Prone position with passive knee flexion iliopsoas: supine position with passive hip flexion  seated position,active internal rotation of the tibia, knee extended    Contralateral leg remains on the table without contracture      Neurovascular:  Sensation to light touch Posterior tibial artery   Intact and equal bilaterally Intact and equal bilaterally     (Test not completed if space left blank )             Procedures       Portions of the record may have been created with voice recognition software. Occasional wrong word or \"sound alike\" substitutions may have occurred due to the inherent limitations of voice recognition software. Please review the chart carefully and recognize, using context, where substitutions/typographical errors may have occurred.   "

## 2024-05-31 ENCOUNTER — SOCIAL WORK (OUTPATIENT)
Dept: BEHAVIORAL/MENTAL HEALTH CLINIC | Facility: CLINIC | Age: 49
End: 2024-05-31

## 2024-05-31 DIAGNOSIS — F41.1 GAD (GENERALIZED ANXIETY DISORDER): ICD-10-CM

## 2024-05-31 DIAGNOSIS — F32.2 MODERATELY SEVERE MAJOR DEPRESSION (HCC): ICD-10-CM

## 2024-05-31 DIAGNOSIS — F40.01 PANIC DISORDER WITH AGORAPHOBIA: Primary | ICD-10-CM

## 2024-05-31 NOTE — PSYCH
" Behavioral Health Psychotherapy Assessment    Date of Initial Psychotherapy Assessment: 05/31/24  Referral Source: PCP  Has a release of information been signed for the referral source? Yes    Preferred Name: Devon Rollins Jr.  Preferred Pronouns: He/him  YOB: 1975 Age: 49 y.o.  Sex assigned at birth: male   Gender Identity: straight   Race:   Preferred Language: English    Emergency Contact:  Full Name: Keiry Turner  Relationship to Client: sister  Contact information: [phone]    Primary Care Physician:  Tomasz Catherine MD  [address] [place] [id number] [phone]  Has a release of information been signed? Yes    Physical Health History:  Past surgical procedures: couple of left knees surgeries  Do you have a history of any of the following:   Do you have any mobility issues? No.     Relevant Family History:    Mom's sister: Depression  Grandmother dad's side: OCD traits  Parents: passed on cancer  Mom: depression    Presenting Problem (What brings you in?)    Devon reported having severe anxiety, starting at young age around 12. He tried to cope by being outside, engaging in sports activities. Reported being very isolated, shy person, feeling left out, going to school made him more anxious. Reported starting panic attacks episodes at his early 20ths. Recently he become more isolated, perceiving world as danger, cancelling activities, refusing to attend crowed places or events. Reported certain people triggers his anxiety \"sometimes can be overwhelming\" however it depends on his quality of sleep, Devon added. Admits only sleeping 4-5 hours, other times does not sleep at all. There is trouble concentrating as well. Reported \"anxiety is my bad thing\", adding that he feels bad about himself or letting my family down because of cancelling to events. Noticed his socialization has changed in the last five years, feeling uncomfortable everywhere he goes.  Most " significant symptoms are racing thoughts, heart rate, ruminating thoughts, worrying about people. Months ago Devon describe an anxiety attack incident after forcing himself going to an uncle death ceremony, endorsing a mental breakdown. Went to counseling around 2005. Devon  noted experiencing OCD, worsen when anxiety is present.  Apetite fluctues, sometimes eating a lot, others not eating, however admits trying to eat healthy. States family is supportive and understanding. Devon is looking at examinen how he can deal with the distress around his mental health symptoms by thinking about getting a dog for . Added that he then started ruminating and anticipating false scenarios worrying about if the barking will disturb him. Devon has signed up for some meet up groups as well for socialization. Devon works remote, getting overwhelmed with deadlines. Currently he is on a leave of absence since April due severity of his symptoms. Devon is informed of the purpose of this Biopsychosocial assessment, attendance policy and the limits of confidentiality.    Mental Health Advance Directive:  Do you currently have a Mental Health Advance Directive?yes    Diagnosis:   Diagnosis ICD-10-CM Associated Orders   1. Panic disorder with agoraphobia  F40.01       2. Moderately severe major depression (HCC)  F32.2 Ambulatory referral to Psych Services      3. TIMOTHY (generalized anxiety disorder)  F41.1 Ambulatory referral to Psych Services          Initial Assessment:     Current Mental Status:    Appearance: neat      Behavior/Manner: cooperative and restless      Affect/Mood:  Anxious, depressed, sad, blue and fearful    Speech:  Normal and articulate    Sleep:  Interrupted    Oriented to: oriented to self, oriented to place and oriented to time       Clinical Symptoms    Depression: yes      Anxiety: yes      Depression Symptoms: depressed mood, restlessness, serious loss of interest in things, social isolation, fatigue, poor  concentration and sleep disturbance      Anxiety Symptoms: excessive worry, fatigues easily, fear of losing control, nervous/anxious, difficulty controlling worry, restlessness, chest tightness and shortness of breath      Have you ever been assaultive to others or the environment: No      Have you ever been self-injurious: No      Counseling History:  Previous Counseling or Treatment  (Mental Health or Drug & Alcohol): No    Have you previously taken psychiatric medications: No      Suicide Risk Assessment  Have you ever had a suicide attempt: No    Have you had incidents of suicidal ideation: No    Are you currently experiencing suicidal thoughts: No      Substance Abuse/Addiction Assessment:  Alcohol: No    Heroin: No    Fentanyl: No    Opiates: No    Cocaine: No    Amphetamines: No    Hallucinogens: No    Club Drugs: No    Benzodiazepines: No    Other Rx Meds: No    Marijuana: No    Tobacco/Nicotine: No    Have you experienced blackouts as a result of substance use: No    Have you had any periods of abstinence: No    Have you experienced symptoms of withdrawal: No    Have you ever overdosed on any substances?: No    Are you currently using any Medication Assisted Treatment for Substance Use: No      Disordered Eating History:  Do you have a history of disordered eating: No      Social Determinants of Health:    SDOH:  Social isolation and stress    Trauma and Abuse History:    Have you ever been abused: No      Legal History:    Have you ever been arrested  or had a DUI: No      Have you been incarcerated: No      Are you currently on parole/probation: No      Any current Children and Youth involvement: No      Any pending legal charges: No      Relationship History:    Current marital status: single      Natural Supports:  Mother and siblings    Relationship History:  Aunt    Employment History    Are you currently employed: Yes      Longest period of employment:  41/2    Employer/ Job title:       Future work goals:  Thinking on career change, would like to do more outdoor job,    Sources of income/financial support:  Work     History:      Status: no history of  duty  Educational History:     Have you ever been diagnosed with a learning disability: No      Highest level of education:  Bachelor's Degree    Have you ever had an IEP or 504-plan: No      Do you need assistance with reading or writing: No      Recommended Treatment:     Psychotherapy:  Individual sessions    Frequency:  2 times    Session frequency:  Monthly      Visit start and stop times:    05/31/24  Start Time: 1102  Stop Time: 1202  Total Visit Time: 60 minutes

## 2024-06-04 ENCOUNTER — HOSPITAL ENCOUNTER (OUTPATIENT)
Dept: MRI IMAGING | Facility: HOSPITAL | Age: 49
Discharge: HOME | End: 2024-06-04
Attending: FAMILY MEDICINE
Payer: COMMERCIAL

## 2024-06-04 DIAGNOSIS — M25.562 ACUTE PAIN OF LEFT KNEE: ICD-10-CM
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
## 2024-06-04 DIAGNOSIS — M25.60 LIMITED JOINT RANGE OF MOTION (ROM): ICD-10-CM

## 2024-06-04 DIAGNOSIS — Z98.890 HX OF LEFT KNEE SURGERY: ICD-10-CM

## 2024-06-04 DIAGNOSIS — T14.90XA INJURY: ICD-10-CM

## 2024-06-04 PROCEDURE — 73721 MRI JNT OF LWR EXTRE W/O DYE: CPT

## 2024-06-05 ENCOUNTER — OFFICE VISIT (OUTPATIENT)
Dept: FAMILY MEDICINE CLINIC | Facility: CLINIC | Age: 49
End: 2024-06-05
Payer: COMMERCIAL

## 2024-06-05 VITALS
HEART RATE: 92 BPM | HEIGHT: 72 IN | OXYGEN SATURATION: 99 % | BODY MASS INDEX: 20.45 KG/M2 | TEMPERATURE: 97.2 F | WEIGHT: 151 LBS | SYSTOLIC BLOOD PRESSURE: 110 MMHG | DIASTOLIC BLOOD PRESSURE: 70 MMHG

## 2024-06-05 DIAGNOSIS — F41.1 GAD (GENERALIZED ANXIETY DISORDER): Primary | ICD-10-CM

## 2024-06-05 DIAGNOSIS — F32.2 MODERATELY SEVERE MAJOR DEPRESSION (HCC): ICD-10-CM

## 2024-06-05 DIAGNOSIS — F41.8 SITUATIONAL ANXIETY: ICD-10-CM

## 2024-06-05 PROCEDURE — 99214 OFFICE O/P EST MOD 30 MIN: CPT | Performed by: STUDENT IN AN ORGANIZED HEALTH CARE EDUCATION/TRAINING PROGRAM

## 2024-06-05 RX ORDER — PROPRANOLOL HYDROCHLORIDE 10 MG/1
10 TABLET ORAL 2 TIMES DAILY PRN
Qty: 30 TABLET | Refills: 0 | Status: SHIPPED | OUTPATIENT
Start: 2024-06-05

## 2024-06-05 NOTE — PROGRESS NOTES
Ambulatory Visit  Name: Devon Rollins Jr.      : 1975      MRN: [id number]  Encounter Provider: Tomasz Catherine MD  Encounter Date: 2024   Encounter department: [facility]    Assessment & Plan   1. TIMOTHY (generalized anxiety disorder)  Assessment & Plan:  Advised patient to continue with therapy and lifestyle changes given improvement.  Will trial patient on propranolol 10 mg as needed for situational anxiety.  Patient also inquiring of support groups specifically for anxiety.  Referral to social work placed to provide patient with possible resources.  Orders:  -     Ambulatory Referral to Social Work Care Management Program; Future  2. Moderately severe major depression (HCC)  -     Ambulatory Referral to Social Work Care Management Program; Future  3. Situational anxiety  -     propranolol (INDERAL) 10 mg tablet; Take 1 tablet (10 mg total) by mouth 2 (two) times a day as needed (situational anxiety)  -     Ambulatory Referral to Social Work Care Management Program; Future    Patient to return in 1 month for follow-up.       History of Present Illness     HPI  Patient presenting to follow-up anxiety and depression.  Patient reports that he has been doing better while on his leave.  Patient has been tolerating Atarax medication as needed and has been going on hikes and watching his diet which seems to have helped his mood.  Patient has also established care with therapy.  Patient today states he is overall doing better, however would like one more therapy session prior to returning to work.      Review of Systems   Constitutional:  Negative for chills and fever.   HENT:  Negative for sore throat.    Respiratory:  Negative for cough and shortness of breath.    Cardiovascular:  Negative for chest pain and palpitations.   Gastrointestinal:  Negative for abdominal pain, constipation, diarrhea, nausea and vomiting.   Genitourinary:  Negative for difficulty  urinating and dysuria.   Neurological:  Negative for dizziness and headaches.       Objective     /70 (BP Location: Left arm, Patient Position: Sitting, Cuff Size: Standard)   Pulse 92   Temp (!) 97.2 °F (36.2 °C) (Temporal)   Ht 6' (1.829 m)   Wt 68.5 kg (151 lb)   SpO2 99%   BMI 20.48 kg/m²     Physical Exam  Constitutional:       Appearance: Normal appearance.   HENT:      Head: Normocephalic and atraumatic.   Cardiovascular:      Rate and Rhythm: Normal rate and regular rhythm.      Heart sounds: Normal heart sounds. No murmur heard.  Pulmonary:      Breath sounds: Normal breath sounds. No wheezing.   Abdominal:      Palpations: Abdomen is soft.      Tenderness: There is no abdominal tenderness. There is no guarding or rebound.   Musculoskeletal:      Right lower leg: No edema.      Left lower leg: No edema.   Neurological:      Mental Status: He is alert and oriented to person, place, and time.   Psychiatric:         Mood and Affect: Mood normal.         Behavior: Behavior normal.       Administrative Statements

## 2024-06-05 NOTE — LETTER
June 5, 2024     Patient: Devon Rollins .  YOB: 1975  Date of Visit: 6/5/2024      To Whom it May Concern:    Devon Rollins is under my professional care. Devon was seen in my office on 6/5/2024. Devon may return to work on 6/17/2024 .  Patient would like another visit to manage anxiety/depression with therapist before returning to work.    If you have any questions or concerns, please don't hesitate to call.         Sincerely,          Tomasz Catherine MD

## 2024-06-05 NOTE — ASSESSMENT & PLAN NOTE
Advised patient to continue with therapy and lifestyle changes given improvement.  Will trial patient on propranolol 10 mg as needed for situational anxiety.  Patient also inquiring of support groups specifically for anxiety.  Referral to social work placed to provide patient with possible resources.

## 2024-06-06 ENCOUNTER — PATIENT OUTREACH (OUTPATIENT)
Dept: FAMILY MEDICINE CLINIC | Facility: CLINIC | Age: 49
End: 2024-06-06

## 2024-06-06 NOTE — PROGRESS NOTES
AMBREEN CACERES received a referral from patient's PCP regarding patient's interest in support groups for anxiety. AMBREEN CACERES did speak with [employer] ([employer]) and they provided info for their weekly virtual support groups every Thursday from 6:00-7:30PM.    --    AMBREEN CACERES received a call in return from pt. Pt states he does have a therapist he recently started seeing but he wanted to supplement the therapy with a support group. Pt reports when living in [place], he found a support group helpful. AMBREEN CACERES explained that there are no in person support groups in the area for anxiety, but [facility] does have a virtual one which he may find helpful. AMBREEN CACERES provided details for group via email. Pt denies having any additional concerns. Referral will be closed.

## 2024-06-14 ENCOUNTER — OFFICE VISIT (OUTPATIENT)
Dept: OBGYN CLINIC | Facility: CLINIC | Age: 49
End: 2024-06-14
Payer: COMMERCIAL

## 2024-06-14 ENCOUNTER — SOCIAL WORK (OUTPATIENT)
Dept: BEHAVIORAL/MENTAL HEALTH CLINIC | Facility: CLINIC | Age: 49
End: 2024-06-14

## 2024-06-14 VITALS
HEIGHT: 72 IN | DIASTOLIC BLOOD PRESSURE: 70 MMHG | BODY MASS INDEX: 20.45 KG/M2 | WEIGHT: 151 LBS | SYSTOLIC BLOOD PRESSURE: 110 MMHG

## 2024-06-14 DIAGNOSIS — S80.02XA CONTUSION OF LEFT KNEE, INITIAL ENCOUNTER: ICD-10-CM

## 2024-06-14 DIAGNOSIS — F32.2 MODERATELY SEVERE MAJOR DEPRESSION (HCC): Primary | ICD-10-CM

## 2024-06-14 DIAGNOSIS — M25.60 LIMITED JOINT RANGE OF MOTION (ROM): ICD-10-CM

## 2024-06-14 DIAGNOSIS — F40.01 PANIC DISORDER WITH AGORAPHOBIA: ICD-10-CM

## 2024-06-14 DIAGNOSIS — M25.562 ACUTE PAIN OF LEFT KNEE: Primary | ICD-10-CM

## 2024-06-14 DIAGNOSIS — S83.242A TEAR OF MEDIAL MENISCUS OF LEFT KNEE, UNSPECIFIED TEAR TYPE, UNSPECIFIED WHETHER OLD OR CURRENT TEAR, INITIAL ENCOUNTER: ICD-10-CM

## 2024-06-14 DIAGNOSIS — F41.1 GAD (GENERALIZED ANXIETY DISORDER): ICD-10-CM

## 2024-06-14 DIAGNOSIS — T14.90XA INJURY: ICD-10-CM
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
## 2024-06-14 DIAGNOSIS — Z98.890 HX OF LEFT KNEE SURGERY: ICD-10-CM

## 2024-06-14 PROCEDURE — 99213 OFFICE O/P EST LOW 20 MIN: CPT | Performed by: FAMILY MEDICINE

## 2024-06-14 NOTE — PROGRESS NOTES
Assessment:     1. Acute pain of left knee  Ambulatory Referral to Orthopedic Surgery    Ambulatory Referral to Physical Therapy      2. Limited joint range of motion (ROM)  Ambulatory Referral to Orthopedic Surgery    Ambulatory Referral to Physical Therapy      3. Injury  Ambulatory Referral to Orthopedic Surgery    Ambulatory Referral to Physical Therapy      4. Fall, subsequent encounter  Ambulatory Referral to Orthopedic Surgery    Ambulatory Referral to Physical Therapy      5. Hx of left knee surgery  Ambulatory Referral to Orthopedic Surgery    Ambulatory Referral to Physical Therapy      6. Contusion of left knee, initial encounter        7. Tear of medial meniscus of left knee, unspecified tear type, unspecified whether old or current tear, initial encounter          Orders Placed This Encounter   Procedures    Ambulatory Referral to Orthopedic Surgery    Ambulatory Referral to Physical Therapy        Impression:   Left knee pain likely multifactorial secondary to potential medial meniscus posterior horn pathology.  Did discuss this could be old versus acute.  As well as bone contusion    Date of injury: 05/28/2024  Mechanism of injury: Fall / twisting injury  Follow-up from injury 2 weeks and 3 days      Conservative Management   We discussed different treatment options:  Previous visit/discussion  Reviewed care now documentation completed on 05/28/2024.  Treatment plan consisted of x-rays.  And referral to Ortho/sports medicine.  Ice or Heat Therapy as needed 1-2 times daily for 10-20 minutes. As tolerated.   Over the counter Tylenol and/or NSAIDs  as needed based off your Past Medical Hx. Please follow product label for dosing and maximum limits.    Formal Handout provided on General Information of PRICE  Please range joint through gentle range of motion as tolerated.   May continue with bracing.   today's discussion/review  Will place a referral for patient to see Dr. Simpson to discuss conservative  versus surgical management.  Will not order MRI arthrogram at this time.  Patient is improving in symptoms.  Patient may discontinue bracing at this time and continue to range joint.  Patient may return to activity as tolerated.         Imaging   Reviewed prior xrays obtain in Urgent or Emergency Department. These were reviewed in office with patient today.   05/28/2024 left knee x-ray: No acute osseous abnormality.  There is mild osteoarthritis  06/04/2024 MRI left knee  Medial meniscus pathology.  Bone edema without fracture in the anterior tibia.  Radiological IMPRESSION:  1. Anteromedial tibial plateau marrow edema consistent with bone contusion without evidence of fracture.  2. Diminutive appearance to the medial meniscus after suspected previous partial meniscectomy. Posterior horn undersurface signal abnormality may reflect scar versus recurrent tear. Direct MR arthrography should provide definitive characterization if   clinically necessary.    Procedure  Not appropriate at this time.     Shared decision making, patient agreeable to plan.      Return for Follow up with Orthopedic Surgeon.    HPI:   Devon Rollins Jr. is a 49 y.o. male  who presents for evaluation of   Chief Complaint   Patient presents with    Left Knee - Pain     Pain in the back of knee if stretched     Today Visit:   Denies any new trauma.   Location: Posterior medial joint line/anterior joint line.  Severity: Current severity: 2/10  Patient feels significantly improved.  Has been able to discontinue brace.  Is still using over-the-counter analgesics and PRICE protocol.  Has returned almost to normal activity.    Previous visit 05/29/2024  Occupation: job entails: sitting   Injury Related: Yes, Date of Injury: 05/28/2024     Onset/Mechanism: Was fishing and fell down. Twisted knee. Injury multiple areas. Left knee pain.   Location: medial joint line.  Severity: Current severity: 3-4/10. Max severity: 8/10.  Pain described  as:stiffness  Radiation: denies.  Provocative: knee flexion and knee extension .  Associated symptoms: swelling. Limited ROM     Denies any hx of fracture of affected limb.   HX of surgery of affected limb. Completed: 2001 and 2013 mensicus       Summary of treatment to-date:   Rest   Brace   ICE therapy    NSAIDs    Following History Reviewed and Updated     Past Medical History:   Diagnosis Date    Allergic     Arthritis     Hyperlipidemia      Past Surgical History:   Procedure Laterality Date    COLONOSCOPY      KNEE ARTHROSCOPY      SHOULDER ARTHROSCOPY       Family History   Problem Relation Age of Onset    Stomach cancer Cousin        Social History     Substance and Sexual Activity   Alcohol Use Yes    Comment: social     Social History     Substance and Sexual Activity   Drug Use Never     Social History     Tobacco Use   Smoking Status Never    Passive exposure: Never   Smokeless Tobacco Never       Social Determinants of Health     Tobacco Use: Low Risk  (6/14/2024)    Patient History     Smoking Tobacco Use: Never     Smokeless Tobacco Use: Never     Passive Exposure: Never   Alcohol Use: Not on file   Financial Resource Strain: Not on file   Food Insecurity: Not on file   Transportation Needs: Not on file   Physical Activity: Not on file   Stress: Not on file   Social Connections: Not on file   Intimate Partner Violence: Not on file   Depression: At risk (4/8/2019)    [facility]    PHQ-2     PHQ-2 Score: 4   Housing Stability: Not on file   Utilities: Not on file   Health Literacy: Not on file        No Known Allergies    Review of Systems      Review of Systems     Review of Systems   Constitutional: Negative for chills and fever.   HENT: Negative for drooling and sneezing.    Eyes: Negative for redness.   Respiratory: Negative for cough and wheezing.    Gastrointestinal: Negative for vomiting.    Psychiatric/Behavioral: Negative for behavioral problems. The patient is not nervous/anxious.      All other systems negative.   Physical Exam   Physical Exam    Vitals and nursing note reviewed.  Constitutional:   Appearance. Normal Appearance.  /70 (BP Location: Left arm, Patient Position: Sitting, Cuff Size: Standard)   Ht 6' (1.829 m)   Wt 68.5 kg (151 lb)   BMI 20.48 kg/m²     Body mass index is 20.48 kg/m².   HENT:  Head: Atraumatic.  Nose: Nose normal  Eyes: Conjunctiva/sclera: Conjunctivae normal.  Cardiovascular:   Rate and Rhythm: Bilateral equal distal pulses  Pulmonary:   Effort: Pulmonary effort is normal  Skin:   General: Skin is warm and dry.  Neurological:   General: No focal deficit present.  Mental Status: Alert and oriented to person, place, and time.   Psychiatric:   Mood and Affect: mood normal.  Behavior: Behavior normal     Musculoskeletal Exam     Ortho Exam     Left Knee:      Inspection:  Erythema Bruising Effusion Muscle atrophy Retracted muscle   Negative Negative Negative Negative Negative      Palpation:  Increased warmth Crepitus Palpable muscle depression   Negative Negative  Negative      Tenderness:  Quadriceps tendon Patella Patellar tendon  Joint line   Neg. Neg. Neg. Medial anterior and posterior joint line                       Tibial tubercle Karina's tubercle Pes anserine bursa Posterior knee   Neg. Neg. Neg. Neg.         Biceps femoris Semimembranosus/semitendinosis Popliteus tendon Fibular head   Neg. Neg. Neg. Neg.         Bilateral Range of Motion:  Active flexion Passive flexion   (normal 135 degrees) (normal 135 degrees)   Intact       Active extension Passive extension   (normal 0 degrees) (normal 0 degrees)   Intact          Bilateral strength:  Seated hip flexion Seated knee flexion Seated knee extension   5/5 5/5 5/5      Seated great toe extension Seated ankle dorsiflexion Seated ankle plantarflexion   5/5 5/5 5/5      Seated hip adduction Seated hip  "abduction Prone knee flexion                 Ligament testing:  Anterior cruciate ligament (ACL)  Posterior cruciate ligament (PCL) Medial Collateral Ligament (MCL)  Lateral Collateral Ligament (LCL):   Lachman's Posterior drawer's Valgus Varus   Negative for laxity Negative for laxity Negative for laxity Negative for laxity      Meniscal testing:  Medial Francesca Lateral Francesca Apley's Thessaly's Bounce home test   No discomfort today. Negative N/A  N/A      Special tests:  Ely's test: Rolando test Ketan's test      Rectus femoris: Prone position with passive knee flexion iliopsoas: supine position with passive hip flexion  seated position,active internal rotation of the tibia, knee extended     Contralateral leg remains on the table without contracture        Neurovascular:  Sensation to light touch Posterior tibial artery   Intact and equal bilaterally Intact and equal bilaterally      (Test not completed if space left blank )            Procedures       Portions of the record may have been created with voice recognition software. Occasional wrong word or \"sound alike\" substitutions may have occurred due to the inherent limitations of voice recognition software. Please review the chart carefully and recognize, using context, where substitutions/typographical errors may have occurred.   "

## 2024-06-14 NOTE — BH CRISIS PLAN
Client Name: Devon Rollins Jr.       Client YOB: 1975    [employer] Safety Plan    Creation Date: 6/14/24 Update Date: 6/14/25   Created By: MACARIO Schmid Last Updated By: MACARIO Schmid      Step 1: Warning Signs:   Warning Signs   panic atacks   heart palpitations   cant think clearly   overthinking            Step 2: Internal Coping Strategies:   Internal Coping Strategies   warm line   meditation   nature walk   positive self talk   exercising   fishing            Step 3: People and social settings that provide distraction:   Name Contact Information   siblings noted   [place]    fish [employer]         Step 4: People whom I can ask for help during a crisis:    Name Contact Information    Yo       Step 5: Professionals or agencies I can contact during a crisis:    Clinican/Agency Name Phone Emergency Contact    [address] [place] [address] crisis [phone]       Crisis Phone Numbers:   Suicide Prevention Lifeline: Call or Text  988 Crisis Text Line: Text HOME to 384-622   Please note: Some [facility] do not have a separate number for Child/Adolescent specific crisis. If your county is not listed under Child/Adolescent, please call the adult number for your county      Adult Crisis Numbers: Child/Adolescent Crisis Numbers   [facility]: [phone] [facility]: [phone]   [facility]: [phone] [facility]: [phone]   [facility]: [phone] [place]: [phone]   [facility]: [phone] [facility]: [phone]   [facility]: [phone]   [facility]: [phone]   [facility]: [phone]   [facility]: [phone] (daytime) [phone] (after hours, weekends, holidays)      Step 6: Making the environment safer (plan for lethal means safety):   Patient did not identify any lethal methods: Yes     Optional: What is most important to me and worth living for?   My  family, I do not want to let them down     Radha Safety Plan. Alicja Soria and Urbano Burns. Used with permission of the authors.

## 2024-06-14 NOTE — BH TREATMENT PLAN
"Outpatient Behavioral Health Psychotherapy Treatment Plan    Devon Rollins   1975     Date of Initial Psychotherapy Assessment: 05/31/24  Date of Current Treatment Plan: 06/14/24  Treatment Plan Target Date: 11/14/24  Treatment Plan Expiration Date: 12/14/24    Diagnosis:   1. Moderately severe major depression (HCC)        2. TIMOTHY (generalized anxiety disorder)        3. Panic disorder with agoraphobia            Area(s) of Need:  Panic attacks, overthinking, irrational thinking, social skills    Long Term Goal 1 (in the client's own words): \"to make my anxiety more manageable, it affects my sleep, my job. I also get depressed, its a cycle\"     Stage of Change: Preparation    Target Date for completion: 12/14/24     Anticipated therapeutic modalities: mindfulness, calming strategies, nature walks, talk therapy, CBT, DBT, thought stopping, solution focused, somatic therapy, creative art expression, feeling expression, self monitoring, goal setting, stress management, conflict resolution, Self care modalities, Reframing, cognitive restructuring, assertive communication.      People identified to complete this goal:      Describe worry and anxiety and how it impacts daily functioning  Educate on cognitive and psychological, behavioral components of anxiety  Learn and Implement calming skills  Develop positive self talk         Long Term Goal 2 (in the client's own words): Form Positive therapeutic Relationship with my therapist     Stage of Change: Preparation    Target Date for completion: 12/14/24     Anticipated therapeutic modalities: mindfulness, calming strategies, nature walks, talk therapy, CBT, DBT, thought stopping, solution focused, somatic therapy, creative art expression, feeling expression, self monitoring, goal setting, stress management, conflict resolution, Self care modalities, Reframing, cognitive restructuring, assertive communication.      People identified to complete this goal: " "Devon    Objective 1.  Will make consistent eye contact with provider  Objective 2. Attend session regularly.  Objective 3. Demonstrate openness to engage in therapy process evidenced by communication of thoughts and feelings.      I am currently under the care of a [facility] psychiatric provider: no    My [facility] psychiatric provider is: none    I am currently taking psychiatric medications: Yes, as prescribed    I feel that I will be ready for discharge from mental health care when I reach the following (measurable goal/objective): \"\"feel better\"    For children and adults who have a legal guardian:   Has there been any change to custody orders and/or guardianship status? No. If yes, attach updated documentation.    I have created my Crisis Plan and have been offered a copy of this plan    Behavioral Health Treatment Plan [facility]: Diagnosis and Treatment Plan explained to Devon Rollins Jr. acknowledges an understanding of their diagnosis. Devon Rollins Jr. agrees to this treatment plan.    I have been offered a copy of this Treatment Plan. yes        "

## 2024-06-14 NOTE — PATIENT INSTRUCTIONS
[facility] [place] [facility] Analysis     Physical Therapy at [facility]     Services Provided:   Comprehensive Evaluation   Running Analysis  Running Readiness Screens  Injury Prevention  Footwear/Orthotics Analysis  Performance Enhancement  Treatment of all running related injuries      Call for an Appointment at:     Sports Medicine & Rehabilitation  [address] [place], [place]  [phone]   Vickie Oliver PT, DPT, LAT, ATC  Urbano Walker PT, DPT  Maximiliano Dukes PT, DPT       [address] [place]  [phone]   Jabari Nguyen PT, MPT  Trent Palm III PT, DPT       For more information - go to: [email or web address]

## 2024-06-14 NOTE — PSYCH
"Behavioral Health Psychotherapy Progress Note    Psychotherapy Provided: Individual Psychotherapy     1. Moderately severe major depression (HCC)        2. TIMOTHY (generalized anxiety disorder)        3. Panic disorder with agoraphobia            Goals addressed in session: Goal 1 and Goal 2     DATA:       Devon reported having a rough night yesterday over thinking on two doctor appointments this morning. Admits he was worried about today two doctor appointments. Yesterday night sleep was impacted by the altered thought processes in regards upcoming events. Become afraid of driving when having a bad night sleep. Anticipate anxiety is linked with resuming work next week. Reported taking a CBD gummies to help with his shoulder and knee pain and sleep, although there were not changes on his sleep. Recognized getting overwhelmed and stressed out as he is returning to work on Monday. Discuss triggers for recent deregulated emotions, creating mental false scenarios about his return to work, anticipating distress on possibly being late when high productivity. Tx and crisis plan completed, LTG goal discussed\"to make my anxiety more manageable, it affects my sleep, my job. I also get depressed, its a cycle \". Client will practice stress management techniques to help lessen anxiety (morning nature walk, fishing, meditation apps, fidgets, visual calming reminders). Devon took notes to remind pending homeworks from this therapy. Therapist praised for efforts and accomplishments.     During this session, this clinician used the following therapeutic modalities: Engagement Strategies, Cognitive Behavioral Therapy, Solution-Focused Therapy, and Supportive Psychotherapy    Substance Abuse was not addressed during this session. If the client is diagnosed with a co-occurring substance use disorder, please indicate any changes in the frequency or amount of use:  Stage of change for addressing substance use diagnoses: No substance use/Not " "applicable    ASSESSMENT:  Devon Rollins Jr. presents with a Euthymic/ normal, Anxious, and Depressed mood. Significant ruminating thoughts     his affect is Normal range and intensity, Blunted, and Tearful, which is congruent, with his mood and the content of the session. The client has not made progress on their goals.    Devon Rollins Jr. presents with a none risk of suicide, none risk of self-harm, and none risk of harm to others.    For any risk assessment that surpasses a \"low\" rating, a safety plan must be developed.    A safety plan was indicated: yes  If yes, describe in detail     PLAN: Between sessions, Devon Rollins Jr. will practice stress management techniques to help lessen anxiety (morning nature walk, fishing, meditation apps, fidgets, visual calming reminders). At the next session, the therapist will use Engagement Strategies, Cognitive Behavioral Therapy, Cognitive Processing Therapy, and Solution-Focused Therapy to address stressors.     Behavioral Health Treatment Plan and Discharge Planning: Devon Rollins Jr. is aware of and agrees to continue to work on their treatment plan. They have identified and are working toward their discharge goals. yes    Visit start and stop times:    06/14/24  Start Time: 1102  Stop Time: 1200  Total Visit Time: 58 minutes  "

## 2024-06-28 ENCOUNTER — SOCIAL WORK (OUTPATIENT)
Dept: BEHAVIORAL/MENTAL HEALTH CLINIC | Facility: CLINIC | Age: 49
End: 2024-06-28
Payer: COMMERCIAL

## 2024-06-28 DIAGNOSIS — F32.2 MODERATELY SEVERE MAJOR DEPRESSION (HCC): Primary | ICD-10-CM

## 2024-06-28 DIAGNOSIS — F41.1 GAD (GENERALIZED ANXIETY DISORDER): ICD-10-CM

## 2024-06-28 PROCEDURE — 90834 PSYTX W PT 45 MINUTES: CPT | Performed by: COUNSELOR

## 2024-06-28 NOTE — PSYCH
"Behavioral Health Psychotherapy Progress Note    Psychotherapy Provided: Individual Psychotherapy     1. Moderately severe major depression (HCC)        2. TIMOTHY (generalized anxiety disorder)            Goals addressed in session: Goal 1 and Goal 2     DATA:     Devon presented today acknowledging increased anxiety and stress after his return to work on the 17th. Reported this week its been \"stressful\". Company is cutting staff down offering employees alternatives for lay out. Indicated having until the 11th. To decide if staying on the company or getting a package which he believes is good plan. Therapist allowed to process, while examine the pro and cons of this event and how it may affect his mental health symptoms. Indicated having a  friend who will take a look to the company papers to ensure he is making a smart decision. Devon's indicated feeling good as his friend also provides him with career advises and opportunities to connect him with resources. Therapist provided positive feedback for being proactive. Indicated medications are working well. Recommended with online resources to alleviate symptoms of anxiety, including online mindfulness videos. Encouraged to continue with therapy and lifestyle changes given improvement. Recommended developing spaces for calming exercises at home and time outdoor, including walks, hiking. He is planning going for a hike this weekend. Asked for in person anxiety groups which therapist gave ideas and advises. Indicates he will think more about it and will further communicate if interested. Clinician provided with copy of the [employer] phone and info as requested.      During this session, this clinician used the following therapeutic modalities: Engagement Strategies, Client-centered Therapy, Cognitive Behavioral Therapy, Solution-Focused Therapy, and Supportive Psychotherapy    Substance Abuse was not addressed during this session. If the client is diagnosed with " "a co-occurring substance use disorder, please indicate any changes in the frequency or amount of use:  Stage of change for addressing substance use diagnoses: Contemplation    ASSESSMENT:  Devon Rollins Jr. presents with a Euthymic/ normal, Anxious, and Depressed mood.     his affect is Normal range and intensity, which is congruent, with his mood and the content of the session. The client has not made progress on their goals.    Devon Rollins Jr. presents with a none risk of suicide, none risk of self-harm, and none risk of harm to others.    For any risk assessment that surpasses a \"low\" rating, a safety plan must be developed.    A safety plan was indicated: no  If yes, describe in detail     PLAN: Between sessions, Devon Rollins Jr. will engage in distraction/calming technique to regulate nervous system.. At the next session, the therapist will use Engagement Strategies, Client-centered Therapy, Cognitive Behavioral Therapy, Cognitive Processing Therapy, Solution-Focused Therapy, and Supportive Psychotherapy to address stressors.    Behavioral Health Treatment Plan and Discharge Planning: Devon Rollins Jr. is aware of and agrees to continue to work on their treatment plan. They have identified and are working toward their discharge goals. yes    Visit start and stop times:    06/28/24  Start Time: 0102  Stop Time: 0154  Total Visit Time: 52 minutes  "

## 2024-07-08 ENCOUNTER — TELEPHONE (OUTPATIENT)
Dept: PSYCHIATRY | Facility: CLINIC | Age: 49
End: 2024-07-08

## 2024-07-08 NOTE — TELEPHONE ENCOUNTER
Devon Rollins Jr. Called the office and yoanna perez and  requested a call back to discuss rescheduling his appt on 7/12.    They can be reached at P# [phone].       Thank you.

## 2024-07-09 ENCOUNTER — CONSULT (OUTPATIENT)
Dept: CARDIOLOGY CLINIC | Facility: CLINIC | Age: 49
End: 2024-07-09
Payer: COMMERCIAL

## 2024-07-09 VITALS
DIASTOLIC BLOOD PRESSURE: 60 MMHG | BODY MASS INDEX: 20.32 KG/M2 | OXYGEN SATURATION: 98 % | HEART RATE: 84 BPM | WEIGHT: 150 LBS | SYSTOLIC BLOOD PRESSURE: 98 MMHG | HEIGHT: 72 IN

## 2024-07-09 DIAGNOSIS — R07.9 INTERMITTENT CHEST PAIN: ICD-10-CM

## 2024-07-09 DIAGNOSIS — R06.02 SHORTNESS OF BREATH ON EXERTION: ICD-10-CM

## 2024-07-09 PROCEDURE — 93000 ELECTROCARDIOGRAM COMPLETE: CPT | Performed by: INTERNAL MEDICINE

## 2024-07-09 PROCEDURE — 99204 OFFICE O/P NEW MOD 45 MIN: CPT | Performed by: INTERNAL MEDICINE

## 2024-07-09 NOTE — PROGRESS NOTES
Outpatient Consultation - General Cardiology   Devon Rollins Jr. 49 y.o. male   MRN: [id number]  Encounter: [id number]            Physician Requesting Consult: Consults   PCP: Tomasz Catherine MD      Reason for Consult / Principal Problem: Family history of CAD      Assessment & Plan      Family history of CAD.    Had a stress test 6 years ago which was normal.  Patient states he is fairly active, does not have any anginal symptoms or anginal equivalents.  No shortness of breath or palpitations.    Dyslipidemia.  Patient's total cholesterol 200, LDL of 139.  Despite patient's family history his ASCVD score is less than 5%, indicating low risk.  I still recommended a statin for plaque stabilization given patient's concern for CAD.    Anxiety          Plan:    Will obtain exercise stress test  Will obtain lipid panel  RTC in 6 months  Recommend treatment for anxiety as it is affecting patient's sleep.  I strongly recommended patient start a low/moderate intensity statin.  Patient would like to hold off on this for now and will let me know during her next visit.        History of Present Illness   I had the pleasure of seeing Devon Rollins Jr. who presents to establish care with [facility].     The patient is seeking cardiology consultation given family history of CAD.  The patient's father suffered his first heart attack in his early 50s and had multiple heart issues, including a near-fatal heart attack, stent placement, and bypass surgery. The patient reports low blood pressure readings, particularly in the mornings, and occasional lightheadedness, especially after standing up quickly or not eating for a while. The patient had a stress test about six to seven years ago, which showed low blood pressure as well. 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 The  patient experiences anxiety and panic attacks, for which they have been prescribed Atarax and beta blockers.        Past Medical History:   Diagnosis Date   • Allergic    • Arthritis    • Hyperlipidemia      Social History     Socioeconomic History   • Marital status: Single     Spouse name: Not on file   • Number of children: Not on file   • Years of education: Not on file   • Highest education level: Not on file   Occupational History   • Occupation: employed   Tobacco Use   • Smoking status: Never     Passive exposure: Never   • Smokeless tobacco: Never   Vaping Use   • Vaping status: Never Used   Substance and Sexual Activity   • Alcohol use: Yes     Comment: social   • Drug use: Never   • Sexual activity: Not Currently   Other Topics Concern   • Not on file   Social History Narrative   • Not on file     Social Determinants of Health     Financial Resource Strain: Not on file   Food Insecurity: Not on file   Transportation Needs: Not on file   Physical Activity: Not on file   Stress: Not on file   Social Connections: Not on file   Intimate Partner Violence: Not on file   Housing Stability: Not on file      Family History   Problem Relation Age of Onset   • Stomach cancer Cousin      Past Surgical History:   Procedure Laterality Date   • COLONOSCOPY     • KNEE ARTHROSCOPY     • SHOULDER ARTHROSCOPY         Current Outpatient Medications:   •  fish oil-omega-3 fatty acids 1000 MG capsule, Take by mouth, Disp: , Rfl:   •  hydrOXYzine HCL (ATARAX) 10 mg tablet, Take 1 tablet (10 mg total) by mouth every 6 (six) hours as needed for anxiety, Disp: 30 tablet, Rfl: 0  •  ibuprofen (MOTRIN) 200 mg tablet, 200 mg as needed, Disp: , Rfl:   •  Magnesium 100 MG CAPS, Take by mouth, Disp: , Rfl:   •  Multiple Vitamin (Tab-A-Constantino) TABS, Take 1 tablet by mouth daily, Disp: , Rfl:   •  polyethylene glycol (MIRALAX) 17 g packet, Take 17 g by mouth daily, Disp: , Rfl:   •  propranolol (INDERAL) 10 mg tablet, Take 1 tablet (10 mg  "total) by mouth 2 (two) times a day as needed (situational anxiety), Disp: 30 tablet, Rfl: 0  •  senna (SENOKOT) 8.6 mg, Take 1 tablet (8.6 mg total) by mouth daily at bedtime, Disp: 30 tablet, Rfl: 0  No Known Allergies      Review of Systems  Review of system was conducted and was negative except for as stated in the HPI.        Objective   Vitals: Blood pressure 98/60, pulse 84, height 6' (1.829 m), weight 68 kg (150 lb), SpO2 98%.      EKG:   Date: 7/9/2024  Interpretation: Normal sinus rhythm      Physical Exam  Vitals reviewed.   Constitutional:       Appearance: Normal appearance.   HENT:      Head: Normocephalic and atraumatic.   Neck:      Vascular: No JVD.   Cardiovascular:      Rate and Rhythm: Normal rate and regular rhythm.      Pulses: Normal pulses.      Heart sounds: No murmur heard.     No gallop.   Pulmonary:      Effort: Pulmonary effort is normal. No respiratory distress.      Breath sounds: No stridor. No wheezing.   Chest:      Chest wall: No tenderness.   Musculoskeletal:      Right lower leg: No edema.      Left lower leg: No edema.   Skin:     General: Skin is warm and dry.   Neurological:      General: No focal deficit present.      Mental Status: He is alert and oriented to person, place, and time.   Psychiatric:         Mood and Affect: Mood normal.         Behavior: Behavior normal.             Lab Results: I have personally reviewed pertinent lab results.    No results found for: \"HSTNI0\", \"HSTNI2\", \"HSTNI4\", \"HSTNI\"  No results found for: \"NTBNP\"  Lab Results   Component Value Date    TRIG 60 05/01/2024    HDL 47 05/01/2024    LDLCALC 139 (H) 05/01/2024     Lab Results   Component Value Date    K 3.5 03/31/2024    CO2 29 03/31/2024     03/31/2024    BUN 13 03/31/2024    CREATININE 0.83 03/31/2024    ALT 19 03/31/2024    AST 22 03/31/2024    TSH 1.06 04/08/2019     Lab Results   Component Value Date    WBC 7.28 03/31/2024    HGB 16.3 03/31/2024    HCT 47.8 03/31/2024    MCV 90 " "03/31/2024     03/31/2024     No results found for: \"INR\"      Imaging: I have personally reviewed pertinent reports.              Eduardo Ortiz MD  Cardiology Fellow   PGY-4      ==========================================================================================    Epic/ Allscripts/Care Everywhere records reviewed: Yes    ** Please Note: Fluency DirectDictation voice to text software may have been used in the creation of this document. **    "

## 2024-07-15 ENCOUNTER — OFFICE VISIT (OUTPATIENT)
Dept: GASTROENTEROLOGY | Facility: CLINIC | Age: 49
End: 2024-07-15
Payer: COMMERCIAL

## 2024-07-15 VITALS
SYSTOLIC BLOOD PRESSURE: 124 MMHG | WEIGHT: 148 LBS | DIASTOLIC BLOOD PRESSURE: 80 MMHG | HEIGHT: 72 IN | TEMPERATURE: 98.8 F | BODY MASS INDEX: 20.05 KG/M2

## 2024-07-15 DIAGNOSIS — K59.04 CHRONIC IDIOPATHIC CONSTIPATION: Primary | ICD-10-CM

## 2024-07-15 PROCEDURE — 99213 OFFICE O/P EST LOW 20 MIN: CPT | Performed by: PHYSICIAN ASSISTANT

## 2024-07-15 NOTE — PROGRESS NOTES
[facility] - Outpatient Follow-up Note  Devon Rollins Jr. 49 y.o. male MRN: [id number]  Encounter: [id number]    ASSESSMENT AND PLAN:    1. Chronic idiopathic constipation  We reviewed his recent colonoscopy results.  Patient expressed understanding to results.  All questions answered.  It was recommended patient undergo repeat colonoscopy in 10 years for colorectal cancer screening purposes.  For his constipation he also underwent CT imaging and blood work which we again reviewed today in the office.  Patient expressed understanding to results.  Thankfully patient does not have any new complaints or alarming symptoms today.    Discussed with the patient today that I suspect he has underlying chronic idiopathic constipation with a likely component of IBS.  I provided education on this.  Patient expressed understanding.  We discussed the importance of a high-fiber diet, exercise, drinking plenty of water daily.  We also discussed that sleep and stress can play a role in IBS.  Discussed in the office today that I think he would benefit from a prescription strength medication for his constipation at this point.  I offered a prescription for Linzess, but he is hesitant at this time.  I did provide samples in the office today.  We discussed that Linzess can cause some initial diarrhea and or may take up to 2 weeks to notice full effect.  He will consider trying the Linzess samples and call the office if he is interested in starting this prescription.  Otherwise, he prefers to continue with MiraLAX and senna daily and work on his compliance and diet.  I think this is reasonable.  No plans for further workup at this time      Patient was instructed to call the office with any questions, concerns, new/ worsening/ persisting GI symptoms. Advised patient go to the ER with any severe or worsening abdominal pain, fevers/ chills, intractable N/V, chest pain, SOB, dizziness, lightheadedness,  feeling something stuck in esophagus that will not go down. Patient expressed understanding and is in agreement with treatment plan.     Will plan to follow up in about 3 to 4 months  __________________________________________________________    SUBJECTIVE:      Patient with a past medical history of anxiety and depression, hyperlipidemia presents to the office today for follow-up.  Patient was last seen in the office by me 4/10/2024, previous office note was reviewed.  At last office visit I ordered a celiac panel, CRP, colonoscopy  Previously ordered labs were not completed.  Colonoscopy was completed 5/20/2024, this was reviewed, this showed evidence of a sigmoid colon polyp which was removed, small hemorrhoids, otherwise colonoscopy was completely normal.  Follow-up was completely benign, not adenomatous.  It was recommended patient undergo repeat colonoscopy in 10 years.  Following the procedure it was recommended patient take MiraLAX 17 g daily and increase to twice daily if needed and increase water and fiber intake.      Unfortunately since last office visit patient continues to complain of constipation issues.  No new complaints today.  He is taking MiraLAX once daily and senna daily at bedtime.  He does admit to some inconsistency with medications/noncompliance.  Despite these medications he continues with constipation.  He can go several days without a bowel movement, have to strain, stool caliber can vary.  He has suffered with constipation for many years.  He has tried increasing fiber in his diet, increasing water intake, exercise, Colace, Dulcolax, fiber supplement, suppository over-the-counter without relief of his constipation.  With his constipation he has associated generalized abdominal discomfort that can radiate to his back, gas, bloating  He has tried align probiotic with some improvement of gas and bloating  Patient denies any fevers/ chills, unintentional weight loss, decreased appetite,  nausea, vomiting, black or bloody stools, heartburn, dysphagia, odynophagia.   Denies chest pain, SOB,    He admits he is under a lot of stress at work      Review of Systems   Constitutional:  Negative for chills and fever.   HENT:  Negative for ear pain and sore throat.    Eyes:  Negative for pain and visual disturbance.   Respiratory:  Negative for cough and shortness of breath.    Cardiovascular:  Negative for chest pain and palpitations.   Gastrointestinal:  Positive for abdominal pain, constipation and nausea. Negative for diarrhea and vomiting.   Genitourinary:  Negative for dysuria, frequency and hematuria.   Musculoskeletal:  Positive for arthralgias and myalgias. Negative for back pain.   Skin:  Negative for color change and rash.   Neurological:  Negative for seizures, syncope and headaches.   All other systems reviewed and are negative.         Historical Information   Past Medical History:   Diagnosis Date    Allergic     Arthritis     Hyperlipidemia      Past Surgical History:   Procedure Laterality Date    COLONOSCOPY      KNEE ARTHROSCOPY      SHOULDER ARTHROSCOPY       Social History   Social History     Substance and Sexual Activity   Alcohol Use Yes    Comment: social     Social History     Substance and Sexual Activity   Drug Use Never     Social History     Tobacco Use   Smoking Status Never    Passive exposure: Never   Smokeless Tobacco Never     Family History   Problem Relation Age of Onset    Stomach cancer Cousin        Meds/Allergies       Current Outpatient Medications:     fish oil-omega-3 fatty acids 1000 MG capsule    hydrOXYzine HCL (ATARAX) 10 mg tablet    ibuprofen (MOTRIN) 200 mg tablet    Magnesium 100 MG CAPS    Multiple Vitamin (Tab-A-Constantino) TABS    polyethylene glycol (MIRALAX) 17 g packet    propranolol (INDERAL) 10 mg tablet    senna (SENOKOT) 8.6 mg    No Known Allergies        Objective     Wt Readings from Last 3 Encounters:   07/15/24 67.1 kg (148 lb)   07/09/24 68 kg (150  lb)   06/14/24 68.5 kg (151 lb)     Temp Readings from Last 3 Encounters:   07/15/24 98.8 °F (37.1 °C) (Tympanic)   06/05/24 (!) 97.2 °F (36.2 °C) (Temporal)   05/28/24 (!) 97.3 °F (36.3 °C) (Tympanic)     BP Readings from Last 3 Encounters:   07/15/24 124/80   07/09/24 98/60   06/14/24 110/70     Pulse Readings from Last 3 Encounters:   07/09/24 84   06/05/24 92   05/28/24 70          PHYSICAL EXAM:      Physical Exam  Vitals reviewed.   Constitutional:       General: He is not in acute distress.     Appearance: He is well-developed.   HENT:      Head: Normocephalic and atraumatic.   Eyes:      Conjunctiva/sclera: Conjunctivae normal.   Cardiovascular:      Rate and Rhythm: Normal rate and regular rhythm.      Heart sounds: No murmur heard.  Pulmonary:      Effort: Pulmonary effort is normal. No respiratory distress.      Breath sounds: Normal breath sounds.   Abdominal:      Palpations: Abdomen is soft.      Tenderness: There is no abdominal tenderness.   Musculoskeletal:         General: No swelling.      Cervical back: Neck supple.   Skin:     General: Skin is warm and dry.      Capillary Refill: Capillary refill takes less than 2 seconds.   Neurological:      General: No focal deficit present.      Mental Status: He is alert and oriented to person, place, and time. Mental status is at baseline.   Psychiatric:         Mood and Affect: Mood normal.         Behavior: Behavior normal.       Lab Results:     Lab Results   Component Value Date    WBC 7.28 03/31/2024    HGB 16.3 03/31/2024    HCT 47.8 03/31/2024    MCV 90 03/31/2024     03/31/2024       Lab Results   Component Value Date    SODIUM 139 03/31/2024    K 3.5 03/31/2024     03/31/2024    CO2 29 03/31/2024    AGAP 8 03/31/2024    BUN 13 03/31/2024    CREATININE 0.83 03/31/2024    GLUC 99 03/31/2024    CALCIUM 9.1 03/31/2024    AST 22 03/31/2024    ALT 19 03/31/2024    ALKPHOS 89 03/31/2024    TP 7.3 03/31/2024    TBILI 0.43 03/31/2024    EGFR  101 03/31/2024     Lab Results   Component Value Date    MJN0CNSHTTMZ 1.283 03/31/2024    TSH 1.06 04/08/2019     Lab Results   Component Value Date    HEPCAB NON-REACTIVE 04/26/2024         Radiology Results:   CT abdomen pelvis with contrast     Result Date: 4/1/2024  Narrative: CT ABDOMEN AND PELVIS WITH IV CONTRAST INDICATION: LLQ pain rectal pain. COMPARISON: None. TECHNIQUE: CT examination of the abdomen and pelvis was performed. Multiplanar 2D reformatted images were created from the source data. This examination, like all CT scans performed in the [facility], was performed utilizing techniques to minimize radiation dose exposure, including the use of iterative reconstruction and automated exposure control. Radiation dose length product (DLP) for this visit: 314.52 mGy-cm IV Contrast: 100 mL of iohexol (OMNIPAQUE) Enteric Contrast: Not administered. FINDINGS: ABDOMEN LOWER CHEST: No clinically significant abnormality in the visualized lower chest. LIVER/BILIARY TREE: Unremarkable. GALLBLADDER: No calcified gallstones. No pericholecystic inflammatory change. SPLEEN: Unremarkable. PANCREAS: Unremarkable. ADRENAL GLANDS: Unremarkable. KIDNEYS/URETERS: Unremarkable. No hydronephrosis. STOMACH AND BOWEL: No obstruction or inflammatory changes. Discoid hyperdensity compatible with medication noted in the duodenum. APPENDIX: Normal. ABDOMINOPELVIC CAVITY: No ascites. No pneumoperitoneum. No lymphadenopathy. VESSELS: Unremarkable for patient's age. PELVIS REPRODUCTIVE ORGANS: Mildly enlarged prostate gland. URINARY BLADDER: Unremarkable. ABDOMINAL WALL/INGUINAL REGIONS: Unremarkable. BONES: No acute fracture or suspicious osseous lesion.      Impression: No acute pathology. Finding (s) were preliminarily reported by [facility] service at the time of examination. Workstation performed: [id number]    Cielo Gardner PA-C   Available on [employer]

## 2024-07-16 ENCOUNTER — DOCUMENTATION (OUTPATIENT)
Dept: GASTROENTEROLOGY | Facility: CLINIC | Age: 49
End: 2024-07-16

## 2024-07-16 NOTE — PROGRESS NOTES
Samples were given at his visit of Linzess 72mcg 2 boxes with 4 pills in each lot# [id number] exp 9/26, 145mcg 2 boxes with 4 pills in each lot# [id number] exp 12/24, also 290mcg 2 boxes with 4 pills in each lot# [id number] exp 3/25.

## 2024-07-17 ENCOUNTER — SOCIAL WORK (OUTPATIENT)
Dept: BEHAVIORAL/MENTAL HEALTH CLINIC | Facility: CLINIC | Age: 49
End: 2024-07-17
Payer: COMMERCIAL

## 2024-07-17 DIAGNOSIS — F41.1 GAD (GENERALIZED ANXIETY DISORDER): ICD-10-CM

## 2024-07-17 DIAGNOSIS — F40.01 PANIC DISORDER WITH AGORAPHOBIA: ICD-10-CM

## 2024-07-17 DIAGNOSIS — F32.2 MODERATELY SEVERE MAJOR DEPRESSION (HCC): Primary | ICD-10-CM

## 2024-07-17 PROCEDURE — 90837 PSYTX W PT 60 MINUTES: CPT | Performed by: COUNSELOR

## 2024-07-17 NOTE — PSYCH
"Behavioral Health Psychotherapy Progress Note    Psychotherapy Provided: Individual Psychotherapy     1. Moderately severe major depression (HCC)        2. TIMOTHY (generalized anxiety disorder)        3. Panic disorder with agoraphobia            Goals addressed in session: Goal 1 and Goal 2     DATA:     Devon came for his individual therapy session. Discuss fears and events precipitating maladaptive responses, leading to increased stress. Expressed increased stress linked with his job performance. Expressed fears on the possibility of losing his job as HR placed him on a performance plan due recent panic attacks. Reported feeling overwhelmed, endorsing poor sleep quality. Described taking the benefits package offered by his company granted at the end of the year, while shared irrational fears and assumptions on losing his job due his inability to manage emotions. Recognized feeling stressed out with deadlines, leading to a recent a panic attack at work. He did apologize to his boss for not completing his tasks for that day however HR reached out to start a weekly check in to increase his performance. Reported and identify frustrations feeling his company and supervisor are not \"fair\" due not showing compassion on his mental health issues.  There is increased somatic behaviors causing stomach pain. Talked about patterns of negative thinking manifesting on \"what if\" messages analizing its content and learning how to replace them for more productive/regulated thoughts. Devon was prompted to make a list of 7 commom negative thoughts to replace them for 7 realistic thoughts. Ended session by encouraging to think and develop a relaxation daily routine/positive self talk to practice on daily basis. Agreed on engaging in mindful walking every morning to relax his nervous system and assist calming his mind from intrusive thoughts. Clinician educated Devon on : accepting thoughts and feelings without judgment, move forward " "through difficult emotions, develop a kind and understanding attitude, increase self-care and develop a more compassionate relationship with his internal experiences.     During this session, this clinician used the following therapeutic modalities: Engagement Strategies, Cognitive Behavioral Therapy, Cognitive Processing Therapy, Solution-Focused Therapy, and Supportive Psychotherapy    Substance Abuse was not addressed during this session. If the client is diagnosed with a co-occurring substance use disorder, please indicate any changes in the frequency or amount of use: . Stage of change for addressing substance use diagnoses: No substance use/Not applicable    ASSESSMENT:  Devon Rollins Jr. presents with a Euthymic/ normal, Angry, Anxious, Dysthymic, and Depressed mood.     his affect is Normal range and intensity, Blunted, Flat, and Tearful, which is congruent, with his mood and the content of the session. The client has not made progress on their goals.     Devon Rollins Jr. presents with a none risk of suicide, none risk of self-harm, and none risk of harm to others.    For any risk assessment that surpasses a \"low\" rating, a safety plan must be developed.    A safety plan was indicated: no  If yes, describe in detail     PLAN: Between sessions, Devon Rollins Jr. will go for mindful walking on mornings at least 4x wk. To alleviate anxious/significant ruminating thoughts. At the next session, the therapist will use Engagement Strategies, Cognitive Behavioral Therapy, Cognitive Processing Therapy, Mindfulness-based Strategies, and Solution-Focused Therapy to address chronicity of symptoms.    Behavioral Health Treatment Plan and Discharge Planning: Devon Rollins Jr. is aware of and agrees to continue to work on their treatment plan. They have identified and are working toward their discharge goals. no    Visit start and stop times:    07/17/24    Start Time: 1202  Stop Time: 1301  Total Visit Time: " 59 minutes

## 2024-08-01 ENCOUNTER — SOCIAL WORK (OUTPATIENT)
Dept: BEHAVIORAL/MENTAL HEALTH CLINIC | Facility: CLINIC | Age: 49
End: 2024-08-01
Payer: COMMERCIAL

## 2024-08-01 DIAGNOSIS — F40.01 PANIC DISORDER WITH AGORAPHOBIA: ICD-10-CM

## 2024-08-01 DIAGNOSIS — F41.1 GAD (GENERALIZED ANXIETY DISORDER): ICD-10-CM

## 2024-08-01 DIAGNOSIS — F32.2 MODERATELY SEVERE MAJOR DEPRESSION (HCC): Primary | ICD-10-CM

## 2024-08-01 PROCEDURE — 90837 PSYTX W PT 60 MINUTES: CPT | Performed by: COUNSELOR

## 2024-08-01 NOTE — PSYCH
"Behavioral Health Psychotherapy Progress Note    Psychotherapy Provided: Individual Psychotherapy     1. Moderately severe major depression (HCC)        2. TIMOTHY (generalized anxiety disorder)        3. Panic disorder with agoraphobia            Goals addressed in session: Goal 1 and Goal 2     DATA:     Reported feeling extremely overwhelmed, \"I am not sleeping at all\". Reported his boss is checking on him on daily basis and HR for the job performance which is adding significant stress. Noted there were a day were he had \"cero\" sleep, however he can sleep better on the weekend. Discuss quality of sleep, and how it affects his mental health.Reported struggles with eating, socialization, and overall wellbeing affecting his mental health.  Heart racing, considering to visit the ER to try medications, however he is not totally sure/not confident. Will try Atarax for sleep aid . Crisis resources reinforced including warm line, JOAN, or visiting the ER for evaluation. Discuss calming strategies to help cooling down nervous system, including tapping, cold water,     During this session, this clinician used the following therapeutic modalities: Engagement Strategies, Mindfulness-based Strategies, Solution-Focused Therapy, and Supportive Psychotherapy    Substance Abuse was not addressed during this session. If the client is diagnosed with a co-occurring substance use disorder, please indicate any changes in the frequency or amount of use: . Stage of change for addressing substance use diagnoses: No substance use/Not applicable    ASSESSMENT:  Devon Rollins Jr. presents with a Euthymic/ normal, Angry, Anxious, Depressed, and Labile mood.     his affect is Normal range and intensity, Blunted, Flat, and Tearful, which is congruent, with his mood and the content of the session. The client has not made progress on their goals.     Devon Rollins Jr. presents with a none risk of suicide, none risk of self-harm, and none risk " "of harm to others.    For any risk assessment that surpasses a \"low\" rating, a safety plan must be developed.    A safety plan was indicated: yes  If yes, describe in detail to revisit crisis plan due exacerbation of symptoms    PLAN: Between sessions, Devon Rollins Jr. will increase physical activities such as mindful walking, practice positive self talk and calming strategies.. At the next session, the therapist will use Mindfulness-based Strategies, Solution-Focused Therapy, and Supportive Psychotherapy to address chronicity of stressors.    Behavioral Health Treatment Plan and Discharge Planning: Devon Rollins Jr. is aware of and agrees to continue to work on their treatment plan. They have identified and are working toward their discharge goals. yes    Visit start and stop times:    08/01/24  Start Time: 0401  Stop Time: 0459  Total Visit Time: 58 minutes  "

## 2024-08-02 ENCOUNTER — TELEMEDICINE (OUTPATIENT)
Dept: FAMILY MEDICINE CLINIC | Facility: CLINIC | Age: 49
End: 2024-08-02
Payer: COMMERCIAL

## 2024-08-02 VITALS — HEIGHT: 72 IN | BODY MASS INDEX: 20.99 KG/M2 | WEIGHT: 155 LBS

## 2024-08-02 DIAGNOSIS — F32.2 MODERATELY SEVERE MAJOR DEPRESSION (HCC): Primary | ICD-10-CM

## 2024-08-02 DIAGNOSIS — F41.1 GAD (GENERALIZED ANXIETY DISORDER): ICD-10-CM

## 2024-08-02 DIAGNOSIS — F51.02 ADJUSTMENT INSOMNIA: ICD-10-CM

## 2024-08-02 PROCEDURE — 99443 PR PHYS/QHP TELEPHONE EVALUATION 21-30 MIN: CPT | Performed by: STUDENT IN AN ORGANIZED HEALTH CARE EDUCATION/TRAINING PROGRAM

## 2024-08-02 NOTE — PROGRESS NOTES
Virtual Brief Visit  Name: Devon Rollins Jr.      : 1975      MRN: [id number]  Encounter Provider: Tomasz Catherine MD  Encounter Date: 2024   Encounter department: [facility]    This Visit is being completed by telephone. The Patient is located at Home and in the following state in which I hold an active license [place]    The patient was identified by name and date of birth. Devon Rollins Jr. was informed that this is a telemedicine visit and that the visit is being conducted through Telephone.  My office door was closed. No one else was in the room.  He acknowledged consent and understanding of privacy and security of the video platform. The patient has agreed to participate and understands they can discontinue the visit at any time.    Patient is aware this is a billable service.     Assessment & Plan   1. Moderately severe major depression (HCC)  -     Ambulatory Referral to Social Work Care Management Program; Future  2. TIMOTHY (generalized anxiety disorder)  -     Ambulatory Referral to Social Work Care Management Program; Future  3. Adjustment insomnia  -     Ambulatory Referral to Social Work Care Management Program; Future    Patient reports that he previously refused need to follow-up with psychiatrist however now is finding it more necessary given recent stressors.  ASAP referral to social work placed to help patient find psychiatrist for patient.  Patient is still not interested in any long-term mental health medications such as SSRIs however would like to continue as needed medications such as Atarax and propranolol.  Patient would like to first discuss these long-term medications with psychiatrist prior to initiation.  Advised patient to follow-up with office if symptoms worsen or fail to improve.           History of Present Illness   HPI  Patient presenting today to follow-up on insomnia and stress.  Patient reports that it has been a stressful  time at work with layoffs and lack of certainty.  Patient reports that this has affected his mental health as well as his sleep.  Patient is following with therapy regularly however thinks it is time for him to establish care with psychiatry.  Patient is still not interested in SSRI medication or long-term use medication without discussion with psychiatry first.  Patient today denies any suicidal or homicidal ideations.    Review of Systems   Constitutional:  Negative for chills and fever.   HENT:  Negative for sore throat.    Respiratory:  Negative for cough and shortness of breath.    Cardiovascular:  Negative for chest pain and palpitations.   Gastrointestinal:  Negative for abdominal pain, constipation, diarrhea, nausea and vomiting.   Genitourinary:  Negative for difficulty urinating and dysuria.   Neurological:  Negative for dizziness and headaches.         Visit Time  Total Visit Duration: 20 mins

## 2024-08-06 ENCOUNTER — PATIENT OUTREACH (OUTPATIENT)
Dept: CASE MANAGEMENT | Facility: OTHER | Age: 49
End: 2024-08-06

## 2024-08-06 NOTE — PROGRESS NOTES
AMBREEN CACERES received request to contact patient to provide support regarding mental health resources.  Chart reviewed and call to patient and message left.  Will await return call and provide assistance as needed.     Received return call from patient who reports that he is currently involved with psychotherapy at [facility].  He reports that he is planning to continue with therapy but would also like to see a psychiatrist for possible medication treatment.  Reviewed with patient that AMBREEN CACERES will send message to MD requesting order for psychiatry and encouraged patient to discuss this request with therapist who may also be able to schedule an appointment with psychiatry for him at [facility] office. Patient verbalizes agreement with this plan.     Patient states that he is experiencing anxiety that is work related. He is employed full time as a .  He reports that he is single, no children and has great family support. Patient is going to reach out to his therapist today to request psychiatry appointment at [facility].      Supportive counseling provided to patient who denies further needs at this time. Message sent to Provider requesting order for psychiatry for [facility].  AMBREEN CACERES will close referral at this time and be available to provide further assistance as needed.

## 2024-08-08 ENCOUNTER — TELEPHONE (OUTPATIENT)
Age: 49
End: 2024-08-08

## 2024-08-14 ENCOUNTER — TELEMEDICINE (OUTPATIENT)
Dept: BEHAVIORAL/MENTAL HEALTH CLINIC | Facility: CLINIC | Age: 49
End: 2024-08-14
Payer: COMMERCIAL

## 2024-08-14 DIAGNOSIS — F41.1 GAD (GENERALIZED ANXIETY DISORDER): Primary | ICD-10-CM

## 2024-08-14 PROCEDURE — 90837 PSYTX W PT 60 MINUTES: CPT | Performed by: COUNSELOR

## 2024-08-14 NOTE — PSYCH
"Behavioral Health Psychotherapy Progress Note    Psychotherapy Provided: Individual Psychotherapy     1. TIMOTHY (generalized anxiety disorder)            Goals addressed in session: Goal 1 and Goal 2     DATA:     Reported sleeping have not improved yet. Continue discussing triggers for sleeping deprivation to assist problem solving. Having a hard time finding a provider due long wait list. Noted looking for different suggestions on medications. Started going to the gym \"haven't get a good quality of sleep yet\" only sleeping a maximum of 4 hours sometimes. Spoke with his manager yesterday for going on a leave. Still facing massive stressors in regards job. Discuss and suggest adult PHP to stabilize symptoms. Devon will talk with  PCP about medication for sleep. Referred boss is supportive and caring. Finances, workload and job transition are main stressors. Talked about benefits of going to gym to lessen anxiety. Praised for good choices and prioritize his health to boost energy level. Reviewed sleep hygiene. Suggest journaling, providing with entries. Prompted to write at night time everything he is holding on his mind to release stress and decrease significant racing thoughts. Reported he is hesitant on going to a family get together on Saturday as sister is coming to visit from [place]. He is afraid of long driving, however plans to call her to chat. Share sister is supportive and understanding on his mental health struggles. Ended session by helping Devon vocalizing a positive affirmation to challenge negative and anxious thoughts \"today I choose to be confident\". Practiced a 3 minute deep breathing to decompress and regulate nervous system. Encouraged to practice it at home as needed. Devon expressed understanding.Devon will search meditation and yoga videos as he would like to rejoin yoga classes.     /   During this session, this clinician used the following therapeutic modalities: Cognitive Behavioral Therapy, " "Mindfulness-based Strategies, Solution-Focused Therapy, and Supportive Psychotherapy    Substance Abuse was not addressed during this session. If the client is diagnosed with a co-occurring substance use disorder, please indicate any changes in the frequency or amount of use: . Stage of change for addressing substance use diagnoses: No substance use/Not applicable    ASSESSMENT:  Devon Rollins Jr. presents with a Euthymic/ normal, Anxious, and Depressed mood.     his affect is Normal range and intensity, Flat, and Tearful, which is congruent, with his mood and the content of the session. The client has not made progress on their goals.     Devon Rollins Jr. presents with a none risk of suicide, none risk of self-harm, and none risk of harm to others.    For any risk assessment that surpasses a \"low\" rating, a safety plan must be developed.    A safety plan was indicated: no  If yes, describe in detail     PLAN: Between sessions, Devon Rollins Jr. will work on journaling, deep breathing. At the next session, the therapist will use Engagement Strategies, Cognitive Behavioral Therapy, Cognitive Processing Therapy, Mindfulness-based Strategies, Solution-Focused Therapy, and Supportive Psychotherapy to address stressors.    Behavioral Health Treatment Plan and Discharge Planning: Devon Rollins Jr. is aware of and agrees to continue to work on their treatment plan. They have identified and are working toward their discharge goals. yes    Visit start and stop times:    08/14/24  Start Time: 0302  Stop Time: 0356  Total Visit Time: 54 minutes  "

## 2024-08-16 ENCOUNTER — TELEMEDICINE (OUTPATIENT)
Dept: FAMILY MEDICINE CLINIC | Facility: CLINIC | Age: 49
End: 2024-08-16
Payer: COMMERCIAL

## 2024-08-16 VITALS — BODY MASS INDEX: 20.59 KG/M2 | HEIGHT: 72 IN | WEIGHT: 152 LBS

## 2024-08-16 DIAGNOSIS — F32.2 MODERATELY SEVERE MAJOR DEPRESSION (HCC): Primary | ICD-10-CM

## 2024-08-16 DIAGNOSIS — F41.1 GAD (GENERALIZED ANXIETY DISORDER): ICD-10-CM

## 2024-08-16 PROBLEM — S80.02XA CONTUSION OF LEFT KNEE, INITIAL ENCOUNTER: Status: RESOLVED | Noted: 2024-05-29 | Resolved: 2024-08-16

## 2024-08-16 PROCEDURE — 99214 OFFICE O/P EST MOD 30 MIN: CPT | Performed by: STUDENT IN AN ORGANIZED HEALTH CARE EDUCATION/TRAINING PROGRAM

## 2024-08-16 NOTE — PROGRESS NOTES
Virtual Regular Visit  Name: Devon Rollins Jr.      : 1975      MRN: [id number]  Encounter Provider: Tomasz Catherine MD  Encounter Date: 2024   Encounter department: [facility]    Verification of patient location:    Patient is located at Home in the following state in which I hold an active license [place]    Assessment & Plan   1. Moderately severe major depression (HCC)  2. TIMOTHY (generalized anxiety disorder)    Explained to patient that we will complete forms for FMLA as a courtesy until he is able to establish with psychiatry.  Explained to patient difficulty in completing forms when patient is hesitant on starting indicated medication.  Patient expressed understanding.  Advised patient to continue working with therapy and social work to obtain appointment with psychiatry at his earliest convenience.  Advised patient to follow-up with office if any other issues or needs were to arise.         Encounter provider Tomasz Catherine MD    The patient was identified by name and date of birth. Devon Rollins Jr. was informed that this is a telemedicine visit and that the visit is being conducted through the Epic Embedded platform. He agrees to proceed..  My office door was closed. No one else was in the room.  He acknowledged consent and understanding of privacy and security of the video platform. The patient has agreed to participate and understands they can discontinue the visit at any time.    Patient is aware this is a billable service.     History of Present Illness     HPI  Patient presenting today to discuss possible FMLA leave for work due to worsening mental health.  Patient reports that he recently excepted a severance package later in September however has noted that since excepting this package work has become more stressful with more demands and this has taken a toll on his mental health.  Patient talked with his manager who suggested leave might be  beneficial for patient.  Patient has been working with therapist and has been struggling to find psychiatrist to better manage his mental health.  Patient is still hesitant on medication despite issues however has found therapy helpful.      Review of Systems   Constitutional:  Negative for chills and fever.   HENT:  Negative for sore throat.    Respiratory:  Negative for cough and shortness of breath.    Cardiovascular:  Negative for chest pain and palpitations.   Gastrointestinal:  Negative for abdominal pain, constipation, diarrhea, nausea and vomiting.   Genitourinary:  Negative for difficulty urinating and dysuria.   Neurological:  Negative for dizziness and headaches.   Psychiatric/Behavioral:  The patient is nervous/anxious.        Objective     Ht 6' (1.829 m)   Wt 68.9 kg (152 lb)   BMI 20.61 kg/m²   Physical Exam  Constitutional:       Appearance: Normal appearance.   HENT:      Head: Normocephalic and atraumatic.   Cardiovascular:      Rate and Rhythm: Normal rate and regular rhythm.      Heart sounds: Normal heart sounds. No murmur heard.  Pulmonary:      Breath sounds: Normal breath sounds. No wheezing.   Abdominal:      Palpations: Abdomen is soft.      Tenderness: There is no abdominal tenderness. There is no guarding or rebound.   Musculoskeletal:      Right lower leg: No edema.      Left lower leg: No edema.   Neurological:      Mental Status: He is alert and oriented to person, place, and time.   Psychiatric:         Mood and Affect: Mood normal.         Behavior: Behavior normal.         Visit Time  Total Visit Duration: 25 mins

## 2024-08-21 ENCOUNTER — TELEPHONE (OUTPATIENT)
Dept: OTHER | Age: 49
End: 2024-08-21

## 2024-08-21 NOTE — TELEPHONE ENCOUNTER
Attempt #1 to contact pt to schedule new pt appt with   Dr. Viktoria Schneider  No answer  LVM providing office phone number

## 2024-08-26 ENCOUNTER — PATIENT MESSAGE (OUTPATIENT)
Dept: FAMILY MEDICINE CLINIC | Facility: CLINIC | Age: 49
End: 2024-08-26

## 2024-08-27 ENCOUNTER — PATIENT MESSAGE (OUTPATIENT)
Dept: FAMILY MEDICINE CLINIC | Facility: CLINIC | Age: 49
End: 2024-08-27

## 2024-08-27 NOTE — TELEPHONE ENCOUNTER
Patient will be sending leave paperwork to our office.  Please see message to start leave date at 8/30, thank you.

## 2024-08-27 NOTE — TELEPHONE ENCOUNTER
Patient called in inquiring if their new patient medication management appointment could be switched to a virtual appointment.    Writer stated the provider and office staff would be made aware of the request and reach out with the providers determination.    The best number to reach out on is [phone]

## 2024-08-27 NOTE — TELEPHONE ENCOUNTER
We did not receive this here can we see if it was faxed to your office because patient has been seen there

## 2024-08-27 NOTE — PATIENT COMMUNICATION
Call from patient. He would like to discuss disability leave and treatment plan with Dr Catherine. He will be sending a My chart message with information.

## 2024-08-28 ENCOUNTER — TELEPHONE (OUTPATIENT)
Age: 49
End: 2024-08-28

## 2024-08-28 NOTE — PATIENT COMMUNICATION
Patient called to follow up on this request.  Patient is planning on going on leave on Friday 8/30/24, and patient would need these forms completed tomorrow morning at the latest. Please fax the forms to [employer] (information is on the form).

## 2024-08-28 NOTE — TELEPHONE ENCOUNTER
Pt removed from [facility] wait list as is scheduled at 8/29 at 3:30 pm

## 2024-08-28 NOTE — TELEPHONE ENCOUNTER
Patient called in and wanted to know if er received the disability paper work. I advised they we have not received them. Patient stated that he will call Agust[facility] and have them faxed over. Patient wanted to know if Dr. Catherine could fill them out today or tomorrow. Patient is requesting to be out 5 weeks.     Please call patient  once the paper work is received. [phone].    Thank you

## 2024-08-29 ENCOUNTER — TELEMEDICINE (OUTPATIENT)
Dept: PSYCHIATRY | Facility: CLINIC | Age: 49
End: 2024-08-29
Payer: COMMERCIAL

## 2024-08-29 DIAGNOSIS — F42.2 MIXED OBSESSIONAL THOUGHTS AND ACTS: ICD-10-CM

## 2024-08-29 DIAGNOSIS — F40.01 PANIC DISORDER WITH AGORAPHOBIA: ICD-10-CM

## 2024-08-29 DIAGNOSIS — F41.1 GAD (GENERALIZED ANXIETY DISORDER): ICD-10-CM

## 2024-08-29 DIAGNOSIS — F33.2 MAJOR DEPRESSIVE DISORDER, RECURRENT SEVERE WITHOUT PSYCHOTIC FEATURES (HCC): Primary | ICD-10-CM

## 2024-08-29 PROCEDURE — 90792 PSYCH DIAG EVAL W/MED SRVCS: CPT | Performed by: PSYCHIATRY & NEUROLOGY

## 2024-08-29 RX ORDER — HYDROXYZINE HYDROCHLORIDE 10 MG/1
10 TABLET, FILM COATED ORAL EVERY 6 HOURS PRN
Qty: 30 TABLET | Refills: 0 | Status: SHIPPED | OUTPATIENT
Start: 2024-08-29

## 2024-08-29 NOTE — ASSESSMENT & PLAN NOTE
Unstable.  Discussed potentially starting SSRI or SNRI. Patient would like to think about it.  Continue hydroxyzine 10 mg q6h PRN for anxiety.  Continue propranolol 10 mg daily PRN for anxiety.  Patient has access to Xanax, last Rx 4/6/24.   n/a n/a
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
## 2024-08-29 NOTE — ASSESSMENT & PLAN NOTE
Unstable. PHQ-9 was 18.  Discussed potentially starting SSRI or SNRI. Patient would like to think about it.  Continue psychotherapy with Kelly Carver at [facility].

## 2024-08-29 NOTE — PATIENT INSTRUCTIONS
Adult Patient Counseling Points     Gabapentin   What is this drug used for?   It is used to treat painful nerve diseases.  It is used to help control certain kinds of seizures.  It may be given to you for other reasons. Talk with the doctor.  All drugs may cause side effects. However, many people have no side effects or only have minor side effects. Call your doctor or get medical help if any of these side effects or any other side effects bother you or do not go away:   Nausea  Vomiting  Diarrhea  Dry mouth  WARNING/CAUTION: Even though it may be rare, some people may have very bad and sometimes deadly side effects when taking a drug. Tell your doctor or get medical help right away if you have any of the following signs or symptoms that may be related to a very bad side effect:   Kidney problems like unable to pass urine, blood in the urine, change in amount of urine passed, or weight gain  Liver problems like dark urine, fatigue, lack of appetite, nausea, abdominal pain, light-colored stools, vomiting, or yellow skin or eyes  Depression like thoughts of suicide, anxiety, agitation, irritability, panic attacks, mood changes, behavioral changes, or confusion  Swollen glands  Behavioral changes  Trouble with memory  Vision changes  Confusion  Tremors  Trouble breathing  Slow breathing  Shallow breathing  Blue/gray skin discoloration  Shortness of breath  Excessive weight gain  Swelling of arms or legs  Severe loss of strength and energy  Involuntary eye movements  Twitching  Chills  Sore throat  Muscle pain  Muscle weakness  Abnormal movements  Trouble swallowing  Change in balance  Trouble speaking  Trouble focusing  Severe fatigue  Passing out  Severe dizziness  Signs of an allergic reaction, like rash; hives; itching; red, swollen, blistered, or peeling skin with or without fever; wheezing; tightness in the chest or throat; trouble breathing, swallowing, or talking; unusual hoarseness; or swelling of the mouth,  face, lips, tongue, or throat.  Note: This is not a comprehensive list of all side effects. Talk to your doctor if you have questions.  Consumer Information Use and Disclaimer: This information should not be used to decide whether or not to take this medicine or any other medicine. Only the healthcare provider has the knowledge and training to decide which medicines are right for a specific patient. This information does not endorse any medicine as safe, effective, or approved for treating any patient or health condition. This is only a limited summary of general information about the medicine’s uses from the patient education leaflet and is not intended to be comprehensive. This limited summary does NOT include all information available about the possible uses, directions, warnings, precautions, interactions, adverse effects, or risks that may apply to this medicine. This information is not intended to provide medical advice, diagnosis or treatment and does not replace information you receive from the healthcare provider. For a more detailed summary of information about the risks and benefits of using this medicine, please speak with your healthcare provider and review the entire patient education leaflet.  Copyright   © 2024 [employer] and its affiliates and/or licensors. All rights reserved.  Last Reviewed Date   2023-05-09    Patient Education     Escitalopram (es sye NOMI oh pram)   Brand Names:  Lexapro   Brand Names: Cain ACH-Escitalopram; ACT Escitalopram ODT; AG-Escitalopram; APO-Escitalopram; Auro-Escitalopram; BIO-Escitalopram; Cipralex; JAMP-Escitalopram; STEPHANIE-Escitalopram; M-Escitalopram; Mar-Escitalopram; MINT-Escitalopram; MYLAN-Escitalopram; JOSE MANUEL-Escitalopram; NRA-Escitalopram; PMS-Escitalopram; PMSC-Escitalopram; MARK ANTHONY-Escitalopram; SANDOZ Escitalopram; TARO-Escitalopram; TEVA-Escitalopram   Warning   For all patients taking this drug:   Drugs like this one have raised the chance of suicidal  thoughts or actions in children and young adults. The risk may be greater in people who have had these thoughts or actions in the past. All people who take this drug need to be watched closely. Call the doctor right away if signs like depression, nervousness, restlessness, grouchiness, panic attacks, or changes in mood or actions are new or worse. Call the doctor right away if any thoughts or actions of suicide occur.  Children:   This drug is not approved for use in all children. Talk with the doctor to be sure that this drug is right for your child.  What is this drug used for?   It is used to treat depression.  It is used to treat anxiety.  It may be given to you for other reasons. Talk with the doctor.  What do I need to tell my doctor BEFORE I take this drug?   If you are allergic to this drug; any part of this drug; or any other drugs, foods, or substances. Tell your doctor about the allergy and what signs you had.  If you are taking any of these drugs: Linezolid or methylene blue.  If you are taking any of these drugs: Citalopram or pimozide.  If you have taken certain drugs for depression or Parkinson's disease in the last 14 days. This includes isocarboxazid, phenelzine, tranylcypromine, selegiline, or rasagiline. Very high blood pressure may happen.  This is not a list of all drugs or health problems that interact with this drug.  Tell your doctor and pharmacist about all of your drugs (prescription or OTC, natural products, vitamins) and health problems. You must check to make sure that it is safe for you to take this drug with all of your drugs and health problems. Do not start, stop, or change the dose of any drug without checking with your doctor.  What are some things I need to know or do while I take this drug?   Tell all of your health care providers that you take this drug. This includes your doctors, nurses, pharmacists, and dentists.  Avoid driving and doing other tasks or actions that call for  you to be alert until you see how this drug affects you.  Do not stop taking this drug all of a sudden without calling your doctor. You may have a greater risk of side effects. If you need to stop this drug, you will want to slowly stop it as ordered by your doctor.  Avoid drinking alcohol while taking this drug.  Talk with your doctor before you use marijuana, other forms of cannabis, or prescription or OTC drugs that may slow your actions.  In depression, sleep and appetite may get better soon after starting this drug. Other depression signs may take up to 4 weeks to get better.  This drug may raise the chance of bleeding. Sometimes, bleeding can be life-threatening. Talk with the doctor.  This drug can cause low sodium levels. Very low sodium levels can be life-threatening, leading to seizures, passing out, trouble breathing, or death.  If you are 65 or older, use this drug with care. You could have more side effects.  This drug may affect growth in children and teens in some cases. They may need regular growth checks. Talk with the doctor.  Tell your doctor if you are pregnant, may be pregnant, or plan to get pregnant while taking this drug. You will need to talk about the benefits and risks to you and the baby. Taking this drug in the third trimester of pregnancy may raise your risk of bleeding after delivery and may lead to some health problems in the .  Tell your doctor if you are breast-feeding. You will need to talk about any risks to your baby.  What are some side effects that I need to call my doctor about right away?   WARNING/CAUTION: Even though it may be rare, some people may have very bad and sometimes deadly side effects when taking a drug. Tell your doctor or get medical help right away if you have any of the following signs or symptoms that may be related to a very bad side effect:  Signs of an allergic reaction, like rash; hives; itching; red, swollen, blistered, or peeling skin with or  without fever; wheezing; tightness in the chest or throat; trouble breathing, swallowing, or talking; unusual hoarseness; or swelling of the mouth, face, lips, tongue, or throat.  Signs of low sodium levels like headache, trouble focusing, memory problems, feeling confused, weakness, seizures, or change in balance.  Signs of bleeding like throwing up or coughing up blood; vomit that looks like coffee grounds; blood in the urine; black, red, or tarry stools; bleeding from the gums; abnormal vaginal bleeding; bruises without a cause or that get bigger; or bleeding you cannot stop.  Seizures.  Fever or chills.  Painful erection (hard penis) or an erection that lasts for longer than 4 hours.  Sex problems have happened with drugs like this one. This includes lowered interest in sex, trouble having an orgasm, ejaculation problems, or trouble getting or keeping an erection. If you have any questions, talk with your doctor.  Some people may have a higher chance of eye problems with this drug. Your doctor may want you to have an eye exam to see if you have a higher chance of these eye problems. Call your doctor right away if you have eye pain, change in eyesight, or swelling or redness in or around the eye.  A severe and sometimes deadly problem called serotonin syndrome may happen. The risk may be greater if you also take certain other drugs. Call your doctor right away if you have agitation; change in balance; confusion; hallucinations; fever; fast or abnormal heartbeat; flushing; muscle twitching or stiffness; seizures; shivering or shaking; sweating a lot; severe diarrhea, upset stomach, or throwing up; or very bad headache.  What are some other side effects of this drug?   All drugs may cause side effects. However, many people have no side effects or only have minor side effects. Call your doctor or get medical help if any of these side effects or any other side effects bother you or do not go away:  Feeling dizzy,  sleepy, tired, or weak.  Upset stomach.  Diarrhea or constipation.  Dry mouth.  Trouble sleeping.  Sweating a lot.  Flu-like signs.  Runny nose.  Headache.  Yawning.  These are not all of the side effects that may occur. If you have questions about side effects, call your doctor. Call your doctor for medical advice about side effects.  You may report side effects to your national health agency.  You may report side effects to the FDA at 1-576.813.6134. You may also report side effects at https://www.fda.gov/medwatch.  How is this drug best taken?   Use this drug as ordered by your doctor. Read all information given to you. Follow all instructions closely.  All products:   Take with or without food.  Keep taking this drug as you have been told by your doctor or other health care provider, even if you feel well.  Oral solution:   Measure liquid doses carefully. Use the measuring device that comes with this drug. If there is none, ask the pharmacist for a device to measure this drug.  What do I do if I miss a dose?   Take a missed dose as soon as you think about it.  If it is close to the time for your next dose, skip the missed dose and go back to your normal time.  Do not take 2 doses at the same time or extra doses.  How do I store and/or throw out this drug?   Store at room temperature in a dry place. Do not store in a bathroom.  Keep all drugs in a safe place. Keep all drugs out of the reach of children and pets.  Throw away unused or  drugs. Do not flush down a toilet or pour down a drain unless you are told to do so. Check with your pharmacist if you have questions about the best way to throw out drugs. There may be drug take-back programs in your area.  General drug facts   If your symptoms or health problems do not get better or if they become worse, call your doctor.  Do not share your drugs with others and do not take anyone else's drugs.  Some drugs may have another patient information leaflet. If  you have any questions about this drug, please talk with your doctor, nurse, pharmacist, or other health care provider.  This drug comes with an extra patient fact sheet called a Medication Guide. Read it with care. Read it again each time this drug is refilled. If you have any questions about this drug, please talk with the doctor, pharmacist, or other health care provider.  If you think there has been an overdose, call your poison control center or get medical care right away. Be ready to tell or show what was taken, how much, and when it happened.  Consumer Information Use and Disclaimer   This generalized information is a limited summary of diagnosis, treatment, and/or medication information. It is not meant to be comprehensive and should be used as a tool to help the user understand and/or assess potential diagnostic and treatment options. It does NOT include all information about conditions, treatments, medications, side effects, or risks that may apply to a specific patient. It is not intended to be medical advice or a substitute for the medical advice, diagnosis, or treatment of a health care provider based on the health care provider's examination and assessment of a patient's specific and unique circumstances. Patients must speak with a health care provider for complete information about their health, medical questions, and treatment options, including any risks or benefits regarding use of medications. This information does not endorse any treatments or medications as safe, effective, or approved for treating a specific patient. [employer] and its affiliates disclaim any warranty or liability relating to this information or the use thereof. The use of this information is governed by the Terms of Use, available at https://www.wolterskluwer.com/en/know/clinical-effectiveness-terms.  Last Reviewed Date   2023-09-05  Copyright   © 2024 [employer] and its affiliates and/or licensors. All rights  reserved.    Patient Education     Duloxetine (doo LOX e teen)   Brand Names: [place] Cymbalta; Drizalma Sprinkle [DSC]   Brand Names: Cain ACCEL-Duloxetine; AG-Duloxetine; APO-Duloxetine; Auro-Duloxetine; BIO-Duloxetine; Cymbalta; JAMP-Duloxetine; M-Duloxetine; Mar-Duloxetine; MINT-Duloxetine; NRA-Duloxetine; PMS-Duloxetine; PRIVA-Duloxetine [DSC]; RAN-Duloxetine; MARK ANTHONY-Duloxetine [DSC]; SANDOZ Duloxetine; TEVA-Duloxetine   Warning   Drugs like this one have raised the chance of suicidal thoughts or actions in children and young adults. The risk may be greater in people who have had these thoughts or actions in the past. All people who take this drug need to be watched closely. Call the doctor right away if signs like depression, nervousness, restlessness, grouchiness, panic attacks, or changes in mood or actions are new or worse. Call the doctor right away if any thoughts or actions of suicide occur.  What is this drug used for?   It is used to treat depression.  It is used to treat anxiety.  It is used to help painful nerve diseases and diabetic nerve problems.  It is used to ease long-term pain problems.  It is used to treat fibromyalgia.  It may be given to you for other reasons. Talk with the doctor.  What do I need to tell my doctor BEFORE I take this drug?   If you are allergic to this drug; any part of this drug; or any other drugs, foods, or substances. Tell your doctor about the allergy and what signs you had.  If you have any of these health problems: Kidney disease or liver disease.  If you are taking thioridazine.  If you are taking any of these drugs: Ciprofloxacin or fluvoxamine.  If you are taking any of these drugs: Linezolid or methylene blue.  If you have taken certain drugs for depression or Parkinson's disease in the last 14 days. This includes isocarboxazid, phenelzine, tranylcypromine, selegiline, or rasagiline. Very high blood pressure may happen.  This is not a list of all drugs or health  problems that interact with this drug.  Tell your doctor and pharmacist about all of your drugs (prescription or OTC, natural products, vitamins) and health problems. You must check to make sure that it is safe for you to take this drug with all of your drugs and health problems. Do not start, stop, or change the dose of any drug without checking with your doctor.  What are some things I need to know or do while I take this drug?   Tell all of your health care providers that you take this drug. This includes your doctors, nurses, pharmacists, and dentists.  Avoid driving and doing other tasks or actions that call for you to be alert until you see how this drug affects you.  To lower the chance of feeling dizzy or passing out, rise slowly if you have been sitting or lying down. Be careful going up and down stairs.  Low blood pressure, falls, and passing out have happened with this drug. Falls may lead to problems like broken bones and the need to go to the hospital. The chance of falling is raised with older people. Talk with the doctor.  If you have high blood sugar (diabetes), you will need to watch your blood sugar closely.  High blood pressure has happened with this drug. Have your blood pressure checked as you have been told by your doctor.  Talk with your doctor before you use alcohol, marijuana or other forms of cannabis, or prescription or OTC drugs that may slow your actions.  This drug may raise the chance of bleeding. Sometimes, bleeding can be life-threatening. Talk with the doctor.  A severe and sometimes deadly problem called serotonin syndrome may happen. The risk may be greater if you also take certain other drugs. Call your doctor right away if you have agitation; change in balance; confusion; hallucinations; fever; fast or abnormal heartbeat; flushing; muscle twitching or stiffness; seizures; shivering or shaking; sweating a lot; severe diarrhea, upset stomach, or throwing up; or very bad  headache.  Some people may have a higher chance of eye problems with this drug. Your doctor may want you to have an eye exam to see if you have a higher chance of these eye problems. Call your doctor right away if you have eye pain, change in eyesight, or swelling or redness in or around the eye.  This drug can cause low sodium levels. Very low sodium levels can be life-threatening, leading to seizures, passing out, trouble breathing, or death.  This drug may affect certain lab tests. Tell all of your health care providers and lab workers that you take this drug.  If you are 65 or older, use this drug with care. You could have more side effects.  This drug may affect growth in children and teens in some cases. They may need regular growth checks. Talk with the doctor.  Tell your doctor if you are pregnant, may be pregnant, or plan to get pregnant while taking this drug. You will need to talk about the benefits and risks to you and the baby. Taking this drug in the third trimester of pregnancy may raise your risk of bleeding after delivery and may lead to some health problems in the .  Tell your doctor if you are breast-feeding or plan to breast-feed. This drug passes into breast milk and may harm your baby.  What are some side effects that I need to call my doctor about right away?   WARNING/CAUTION: Even though it may be rare, some people may have very bad and sometimes deadly side effects when taking a drug. Tell your doctor or get medical help right away if you have any of the following signs or symptoms that may be related to a very bad side effect:  Signs of an allergic reaction, like rash; hives; itching; red, swollen, blistered, or peeling skin with or without fever; wheezing; tightness in the chest or throat; trouble breathing, swallowing, or talking; unusual hoarseness; or swelling of the mouth, face, lips, tongue, or throat.  Signs of low sodium levels like headache, trouble focusing, memory  problems, feeling confused, weakness, seizures, or change in balance.  Signs of bleeding like throwing up or coughing up blood; vomit that looks like coffee grounds; blood in the urine; black, red, or tarry stools; bleeding from the gums; abnormal vaginal bleeding; bruises without a cause or that get bigger; or bleeding you cannot stop.  Signs of high or low blood pressure like very bad headache or dizziness, passing out, or change in eyesight.  Seizures.  Trouble passing urine.  Sex problems have happened with drugs like this one. This includes lowered interest in sex, trouble having an orgasm, ejaculation problems, or trouble getting or keeping an erection. If you have any questions, talk with your doctor.  Liver problems have happened with this drug. Rarely, this has been deadly. Call your doctor right away if you have signs of liver problems like dark urine, tiredness, decreased appetite, upset stomach or stomach pain, light-colored stools, throwing up, or yellow skin or eyes.  A severe skin reaction (Watson-Ernst syndrome/toxic epidermal necrolysis) may happen. It can cause severe health problems that may not go away, and sometimes death. Get medical help right away if you have signs like red, swollen, blistered, or peeling skin (with or without fever); red or irritated eyes; or sores in your mouth, throat, nose, or eyes.  What are some other side effects of this drug?   All drugs may cause side effects. However, many people have no side effects or only have minor side effects. Call your doctor or get medical help if any of these side effects or any other side effects bother you or do not go away:  Constipation, diarrhea, stomach pain, upset stomach, throwing up, or decreased appetite.  Headache.  Dry mouth.  Trouble sleeping.  Feeling dizzy, sleepy, tired, or weak.  Sweating a lot.  Weight loss.  Nose or throat irritation.  These are not all of the side effects that may occur. If you have questions about  side effects, call your doctor. Call your doctor for medical advice about side effects.  You may report side effects to your national health agency.  You may report side effects to the FDA at 1-517.291.1778. You may also report side effects at https://www.fda.gov/medwatch.  How is this drug best taken?   Use this drug as ordered by your doctor. Read all information given to you. Follow all instructions closely.  All products:   Keep taking this drug as you have been told by your doctor or other health care provider, even if you feel well.  Take with or without food.  Do not stop taking this drug all of a sudden without calling your doctor. You may have a greater risk of side effects. If you need to stop this drug, you will want to slowly stop it as ordered by your doctor.  Capsules:   Swallow whole. Do not chew, open, or crush.  Sprinkle capsules :  Swallow whole. Do not chew or crush.  If you cannot swallow this drug whole, you may sprinkle the contents on applesauce. If you do this, swallow the mixture right away without chewing.  Those who have feeding tubes may use this drug. Use as you have been told. Flush the feeding tube after this drug is given.  What do I do if I miss a dose?   Take a missed dose as soon as you think about it.  If it is close to the time for your next dose, skip the missed dose and go back to your normal time.  Do not take 2 doses at the same time or extra doses.  How do I store and/or throw out this drug?   Store at room temperature in a dry place. Do not store in a bathroom.  Keep all drugs in a safe place. Keep all drugs out of the reach of children and pets.  Throw away unused or  drugs. Do not flush down a toilet or pour down a drain unless you are told to do so. Check with your pharmacist if you have questions about the best way to throw out drugs. There may be drug take-back programs in your area.  General drug facts   If your symptoms or health problems do not get better or  if they become worse, call your doctor.  Do not share your drugs with others and do not take anyone else's drugs.  Some drugs may have another patient information leaflet. If you have any questions about this drug, please talk with your doctor, nurse, pharmacist, or other health care provider.  This drug comes with an extra patient fact sheet called a Medication Guide. Read it with care. Read it again each time this drug is refilled. If you have any questions about this drug, please talk with the doctor, pharmacist, or other health care provider.  If you think there has been an overdose, call your poison control center or get medical care right away. Be ready to tell or show what was taken, how much, and when it happened.  Consumer Information Use and Disclaimer   This generalized information is a limited summary of diagnosis, treatment, and/or medication information. It is not meant to be comprehensive and should be used as a tool to help the user understand and/or assess potential diagnostic and treatment options. It does NOT include all information about conditions, treatments, medications, side effects, or risks that may apply to a specific patient. It is not intended to be medical advice or a substitute for the medical advice, diagnosis, or treatment of a health care provider based on the health care provider's examination and assessment of a patient's specific and unique circumstances. Patients must speak with a health care provider for complete information about their health, medical questions, and treatment options, including any risks or benefits regarding use of medications. This information does not endorse any treatments or medications as safe, effective, or approved for treating a specific patient. UpToDate, Inc. and its affiliates disclaim any warranty or liability relating to this information or the use thereof. The use of this information is governed by the Terms of Use, available at  https://www.wolterskluwer.com/en/know/clinical-effectiveness-terms.  Last Reviewed Date   2023-09-05  Copyright   © 2024 UpToDate, Inc. and its affiliates and/or licensors. All rights reserved.

## 2024-08-29 NOTE — ASSESSMENT & PLAN NOTE
Unstable TIMOTHY-7 of 18.  Discussed potentially starting SSRI or SNRI. Patient would like to think about it.  Continue hydroxyzine 10 mg q6h PRN for anxiety.  Continue propranolol 10 mg daily PRN for anxiety.  Patient has access to Xanax, last Rx 4/6/24, states he has only taken 1 of 10 pills.  Continue psychotherapy with Kelly Carver at [facility].

## 2024-08-29 NOTE — TELEPHONE ENCOUNTER
The above was completed, faxed and scanned into patients' chart today 08/29/24. Patient was notified today also and is aware of Form Fee for this request.

## 2024-09-03 PROBLEM — F33.2 MAJOR DEPRESSIVE DISORDER, RECURRENT SEVERE WITHOUT PSYCHOTIC FEATURES (HCC): Status: ACTIVE | Noted: 2024-04-10

## 2024-09-05 ENCOUNTER — TELEPHONE (OUTPATIENT)
Dept: PSYCHIATRY | Facility: CLINIC | Age: 49
End: 2024-09-05

## 2024-09-05 ENCOUNTER — TELEMEDICINE (OUTPATIENT)
Dept: BEHAVIORAL/MENTAL HEALTH CLINIC | Facility: CLINIC | Age: 49
End: 2024-09-05
Payer: COMMERCIAL

## 2024-09-05 DIAGNOSIS — F41.1 GAD (GENERALIZED ANXIETY DISORDER): Primary | ICD-10-CM

## 2024-09-05 DIAGNOSIS — F40.01 PANIC DISORDER WITH AGORAPHOBIA: ICD-10-CM

## 2024-09-05 DIAGNOSIS — F32.2 MODERATELY SEVERE MAJOR DEPRESSION (HCC): ICD-10-CM

## 2024-09-05 PROCEDURE — 90837 PSYTX W PT 60 MINUTES: CPT | Performed by: COUNSELOR

## 2024-09-05 NOTE — TELEPHONE ENCOUNTER
Patient called in regard to prior note. Patient would like to take the appt offered today. Patient is now scheduled for 9/5/24 at 2pm virtually.

## 2024-09-05 NOTE — PSYCH
"Behavioral Health Psychotherapy Progress Note    Psychotherapy Provided: Individual Psychotherapy     1. TIMOTHY (generalized anxiety disorder)        2. Moderately severe major depression (HCC)        3. Panic disorder with agoraphobia            Goals addressed in session: Goal 1 and Goal 2     DATA:     Reported good experience at his PE as he was able to vent and receive psychoeducational info on the meds. He was able to ask questions, provider clarified. Indicated the new sleep aid is helping. Noted the stress level has decreased a lot \"in the sense that I am more relaxed\". Reported meds makes him drowsy next day, however find the benefit of the med. Devon  is invested in seeking jobs, planning every morning time for job search, although is  trying to \"take it easy\". Devon appears calmer, there is improved quality of sleep. Devon and clinician reviewed last session homeworks. Devon reported after his leave  he is going out more often, using the calm shantanu and implementing yoga exercises. He is trying to build these habits on his daily routine. Admits family gathering went well, as family decided coming to his area, giving him the chance to see siblings and nephews and nieces. Devon affect appeared bright, alert and content. Encouraged to continue journaling at least 5x week, yoga 3x wk.     During this session, this clinician used the following therapeutic modalities: Cognitive Behavioral Therapy, Dialectical Behavior Therapy, Mindfulness-based Strategies, Solution-Focused Therapy, and Supportive Psychotherapy    Substance Abuse was not addressed during this session. If the client is diagnosed with a co-occurring substance use disorder, please indicate any changes in the frequency or amount of use: . Stage of change for addressing substance use diagnoses: No substance use/Not applicable    ASSESSMENT:  Devon Fordtaras [place] presents with a Euthymic/ normal, Anxious, and Depressed mood.     his affect is Normal range and " "intensity, which is not congruent, with his mood and the content of the session. The client has not made progress on their goals.     Devon Rollins Jr. presents with a none risk of suicide, none risk of self-harm, and none risk of harm to others.    For any risk assessment that surpasses a \"low\" rating, a safety plan must be developed.    A safety plan was indicated: no  If yes, describe in detail     PLAN: Between sessions, Devon Rollins Jr. will journal 1x to alleviate inner tension and self regulate. At the next session, the therapist will use Cognitive Behavioral Therapy, Cognitive Processing Therapy, Mindfulness-based Strategies, Solution-Focused Therapy, and Supportive Psychotherapy to address stressors.    Behavioral Health Treatment Plan and Discharge Planning: Devon Rollins Jr. is aware of and agrees to continue to work on their treatment plan. They have identified and are working toward their discharge goals. yes    Visit start and stop times:    09/05/24  Start Time: 1203  Stop Time: 1257  Total Visit Time: 54 minutes  "

## 2024-09-05 NOTE — TELEPHONE ENCOUNTER
Called pt to offer available slot with Kelly 9/5 at 2pm  No answer  Lvm providing above information and phone number for callback

## 2024-09-11 ENCOUNTER — TELEMEDICINE (OUTPATIENT)
Dept: BEHAVIORAL/MENTAL HEALTH CLINIC | Facility: CLINIC | Age: 49
End: 2024-09-11
Payer: COMMERCIAL

## 2024-09-11 DIAGNOSIS — F41.1 GAD (GENERALIZED ANXIETY DISORDER): ICD-10-CM

## 2024-09-11 DIAGNOSIS — F32.2 MODERATELY SEVERE MAJOR DEPRESSION (HCC): Primary | ICD-10-CM

## 2024-09-11 PROCEDURE — 90834 PSYTX W PT 45 MINUTES: CPT | Performed by: COUNSELOR

## 2024-09-11 NOTE — PSYCH
"Virtual Regular Visit  Name: Devon Rollins Jr.      : 1975      MRN: [id number]  Encounter Provider: MACARIO Schmid  Encounter Date: 2024   Encounter department: [facility] [address]    Verification of patient location:    Patient is located at Home in the following state in which I hold an active license [place]    Assessment & Plan  Moderately severe major depression (HCC)         TIMOTHY (generalized anxiety disorder)             Encounter provider MACARIO Schmid    The patient was identified by name and date of birth. Devon Rollins Jr. was informed that this is a telemedicine visit and that the visit is being conducted through the Epic Embedded platform. He agrees to proceed..  My office door was closed. No one else was in the room.  He acknowledged consent and understanding of privacy and security of the video platform. The patient has agreed to participate and understands they can discontinue the visit at any time.    Patient is aware this is a billable service.     History of Present Illness     Devon Rollins Jr. is a 49 y.o. male who presents with depressive and anxiety symptoms      Review of Systems        Objective     There were no vitals taken for this visit.  Physical Exam      Behavioral Health Psychotherapy Progress Note    Psychotherapy Provided: Individual Psychotherapy     1. Moderately severe major depression (HCC)        2. TIMOTHY (generalized anxiety disorder)            Goals addressed in session: Goal 1 and Goal 2     DATA:     Started session with initial check in. Went for a long walk this morning, 45 minutes at a local park, enjoying the walk \"had to force myself to get out of the house\". Clinician praised for good choices and achieving goals. Sleep deprivation still an issue, causing mood dysregulation, lack of energy and low mood. Reported only sleeing 3-4 hours, still stressing out on job search, managing finances. Discuss sources of " "intrusive thoughts and the role of self care on his mental health. Devon admits \"being on his head a lot thinking about jobs\". He is investing his time on searching career advisors, having a meeting this morning, free session. Job advisor recommend not changing careers at this point, he agreed, listed reasons. Admits feeling tired Would like to set up more sessions for job counseling. Will agreed on continue with this services after his finances are more settle. Devon shared experiencing low mood and mood decompensation afraid how his future will looks like. Encourage working through this maladaptive symptoms by challenging and identifying automatic thoughts to replace them for more functional/positive ones. Clinician assisted problem solving, while providing resources such as Career link, career websites. Devon Discuss options to improve sleep quality, including trying yoga, stretching, nature walk, working out. Devon will enroll on Yoga classes to help alleviating symptoms. He would like to implement changes on sleep routine. Clinician provided psychoeducation while shared a link from [facility] for gain more understanding on this topic.       During this session, this clinician used the following therapeutic modalities: Cognitive Behavioral Therapy, Cognitive Processing Therapy, Mindfulness-based Strategies, Solution-Focused Therapy, and Supportive Psychotherapy    Substance Abuse was not addressed during this session. If the client is diagnosed with a co-occurring substance use disorder, please indicate any changes in the frequency or amount of use: n/a. Stage of change for addressing substance use diagnoses: No substance use/Not applicable    ASSESSMENT:  Devon Rollins Jr. presents with a Euthymic/ normal, Anxious, and Depressed mood.     his affect is Normal range and intensity, Blunted, and Flat, which is congruent, with his mood and the content of the session. The client has not made progress on their " "goals.     Devon Rollins Jr. presents with a none risk of suicide, none risk of self-harm, and none risk of harm to others.    For any risk assessment that surpasses a \"low\" rating, a safety plan must be developed.    A safety plan was indicated: no  If yes, describe in detail     PLAN: Between sessions, Devon Rollins Jr. will practice calming strategy, will use community resources as yoga classes, career link to help improving moods and behaviors.. At the next session, the therapist will use Cognitive Behavioral Therapy, Cognitive Processing Therapy, Mindfulness-based Strategies, Solution-Focused Therapy, and Supportive Psychotherapy to address stressors.    Behavioral Health Treatment Plan and Discharge Planning: Devon Rollins Jr. is aware of and agrees to continue to work on their treatment plan. They have identified and are working toward their discharge goals. yes    Visit start and stop times:    09/11/24  Start Time: 0315  Stop Time: 0403  Total Visit Time: 48 minutes  "

## 2024-09-23 ENCOUNTER — TELEMEDICINE (OUTPATIENT)
Dept: PSYCHIATRY | Facility: CLINIC | Age: 49
End: 2024-09-23
Payer: COMMERCIAL

## 2024-09-23 DIAGNOSIS — F42.2 MIXED OBSESSIONAL THOUGHTS AND ACTS: ICD-10-CM

## 2024-09-23 DIAGNOSIS — F41.1 GAD (GENERALIZED ANXIETY DISORDER): ICD-10-CM

## 2024-09-23 DIAGNOSIS — F33.2 MAJOR DEPRESSIVE DISORDER, RECURRENT SEVERE WITHOUT PSYCHOTIC FEATURES (HCC): Primary | ICD-10-CM

## 2024-09-23 DIAGNOSIS — F40.01 PANIC DISORDER WITH AGORAPHOBIA: ICD-10-CM

## 2024-09-23 PROCEDURE — 99214 OFFICE O/P EST MOD 30 MIN: CPT | Performed by: PSYCHIATRY & NEUROLOGY

## 2024-09-23 RX ORDER — MIRTAZAPINE 7.5 MG/1
7.5 TABLET, FILM COATED ORAL
Qty: 30 TABLET | Refills: 2 | Status: SHIPPED | OUTPATIENT
Start: 2024-09-23

## 2024-09-23 NOTE — ASSESSMENT & PLAN NOTE
Somewhat unstable.   Start Remeron 7.5 mg QHS for depression, anxiety, and insomnia.  Continue psychotherapy with Kelly Carver at [facility].  Consider complementary treatments such as chamomile, lavender, exercise, meditation.

## 2024-09-23 NOTE — ASSESSMENT & PLAN NOTE
Somewhat unstable.  Start Remeron 7.5 mg QHS for depression, anxiety, and insomnia.  Continue psychotherapy with Kelly Carver at [facility].  Consider complementary treatments such as chamomile, lavender, exercise, meditation.

## 2024-09-23 NOTE — ASSESSMENT & PLAN NOTE
Somewhat unstable.  Start Remeron 7.5 mg QHS for depression, anxiety, and insomnia.  Consider exposure therapy in the future if needed.   Continue psychotherapy with Kelly Carver at [facility].
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
## 2024-09-24 ENCOUNTER — TELEPHONE (OUTPATIENT)
Dept: PSYCHIATRY | Facility: CLINIC | Age: 49
End: 2024-09-24

## 2024-09-25 ENCOUNTER — TELEMEDICINE (OUTPATIENT)
Dept: BEHAVIORAL/MENTAL HEALTH CLINIC | Facility: CLINIC | Age: 49
End: 2024-09-25
Payer: COMMERCIAL

## 2024-09-25 DIAGNOSIS — F41.1 GAD (GENERALIZED ANXIETY DISORDER): ICD-10-CM

## 2024-09-25 DIAGNOSIS — F40.01 PANIC DISORDER WITH AGORAPHOBIA: ICD-10-CM

## 2024-09-25 DIAGNOSIS — F32.2 MODERATELY SEVERE MAJOR DEPRESSION (HCC): Primary | ICD-10-CM

## 2024-09-25 PROCEDURE — 90837 PSYTX W PT 60 MINUTES: CPT | Performed by: COUNSELOR

## 2024-09-25 NOTE — PSYCH
"Behavioral Health Psychotherapy Progress Note    Psychotherapy Provided: Individual Psychotherapy     1. Moderately severe major depression (HCC)        2. TIMOTHY (generalized anxiety disorder)        3. Panic disorder with agoraphobia            Goals addressed in session: Goal 1 and Goal 2     DATA:     Reported the natural anxiety pill recommended by the prescriber is working, slept for 10 hours \"it never happened\", believe working well. Devon noted some slight improvement on his wellness from a \"combination of few things\" including going out more, changing negative mindset to a more positive one. Stated before taking breakfast he is forcing himself for walks without music, leaving phone. Provided psychoeducation on mindful walking. Positive feedback and reinforce efforts and accomplishments.  Reported turning off all the notifications from phone, still listening to music or pod cast when driving. Admits racing thoughts. Working on job applications. Applied few jobs, prioritizing remotes. Discussed job options including job fairs. Recognized main stressor as seeking out jobs. Motivated to find a job soon. Socializing and being around others still a challenge, planning to go to the gym and to group yoga class. Checking on loved ones, making sure family is ok, video games, reading. He would like to explore musical hobbies to learn something new to avoid being on his head. Devon left this virtual meeting with clear intentions to prioritize self care. No SI, HI, SIB or psychosis.      During this session, this clinician used the following therapeutic modalities: Cognitive Behavioral Therapy, Cognitive Processing Therapy, Mindfulness-based Strategies, and Motivational Interviewing    Substance Abuse was not addressed during this session. If the client is diagnosed with a co-occurring substance use disorder, please indicate any changes in the frequency or amount of use: . Stage of change for addressing substance use " "diagnoses: Preparation    ASSESSMENT:  Devon Rollins Jr. presents with a Euthymic/ normal and Anxious mood.     his affect is Normal range and intensity and Bright, which is congruent, with his mood and the content of the session. The client has made progress on their goals.     Devon Rollins Jr. presents with a none risk of suicide, none risk of self-harm, and none risk of harm to others.    For any risk assessment that surpasses a \"low\" rating, a safety plan must be developed.    A safety plan was indicated: no  If yes, describe in detail     PLAN: Between sessions, Devon Rollins Jr. will go for hiking to alleviative anxiety. Will try to attend a group yoga classes. At the next session, the therapist will use Client-centered Therapy, Cognitive Behavioral Therapy, Cognitive Processing Therapy, Mindfulness-based Strategies, and Supportive Psychotherapy to address stressors.    Behavioral Health Treatment Plan and Discharge Planning: Devon Rollins Jr. is aware of and agrees to continue to work on their treatment plan. They have identified and are working toward their discharge goals. yes    Visit start and stop times:    09/25/24  Start Time: 0300  Stop Time: 0400  Total Visit Time: 60 minutes  "

## 2024-10-09 ENCOUNTER — TELEMEDICINE (OUTPATIENT)
Dept: PSYCHIATRY | Facility: CLINIC | Age: 49
End: 2024-10-09
Payer: COMMERCIAL

## 2024-10-09 DIAGNOSIS — F40.01 PANIC DISORDER WITH AGORAPHOBIA: ICD-10-CM

## 2024-10-09 DIAGNOSIS — F41.1 GAD (GENERALIZED ANXIETY DISORDER): ICD-10-CM

## 2024-10-09 DIAGNOSIS — F33.2 MAJOR DEPRESSIVE DISORDER, RECURRENT SEVERE WITHOUT PSYCHOTIC FEATURES (HCC): Primary | ICD-10-CM

## 2024-10-09 DIAGNOSIS — F42.2 MIXED OBSESSIONAL THOUGHTS AND ACTS: ICD-10-CM

## 2024-10-09 DIAGNOSIS — G47.00 INSOMNIA, UNSPECIFIED TYPE: ICD-10-CM

## 2024-10-09 PROCEDURE — 90833 PSYTX W PT W E/M 30 MIN: CPT | Performed by: PSYCHIATRY & NEUROLOGY

## 2024-10-09 PROCEDURE — 99214 OFFICE O/P EST MOD 30 MIN: CPT | Performed by: PSYCHIATRY & NEUROLOGY

## 2024-10-09 NOTE — ASSESSMENT & PLAN NOTE
Unstable. PHQ-9 of 18.   Discontinued Remeron 7.5 mg QHS, patient did not take it due to worries about potential side effects.  Continue psychotherapy with Kelly Carver at [facility].  Encouraged complementary treatments such as chamomile, lavender, exercise, meditation, anxiety support groups.

## 2024-10-09 NOTE — ASSESSMENT & PLAN NOTE
Unstable. TIMOTHY-7 of 16   Discontinue Remeron 7.5 mg QHS as patient did not try it and finds lavender has been helpful.  Continue psychotherapy with Kelly Carver at [facility].  Encouraged complementary treatments such as chamomile, lavender, exercise, meditation.
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
## 2024-10-09 NOTE — ASSESSMENT & PLAN NOTE
Stabilizing. Notes decrease in panic attacks since leaving work.   Discontinue Remeron 7.5 mg QHS as patient did not try it and finds lavender has been helpful.  Continue psychotherapy with Kelly Carver at [facility].  Encouraged complementary treatments such as chamomile, lavender, exercise, meditation.

## 2024-10-21 ENCOUNTER — TELEPHONE (OUTPATIENT)
Age: 49
End: 2024-10-21

## 2024-10-21 NOTE — TELEPHONE ENCOUNTER
Patient is calling regarding cancelling an appointment.    Date/Time: 10/23 @ 1    Reason: no insurance    Patient was rescheduled: YES [] NO [x]  If yes, when was Patient reschedule for:     Patient requesting call back to reschedule: YES [] NO [x]  Pt will call to see if he can make 11/13 appt

## 2024-11-06 ENCOUNTER — TELEMEDICINE (OUTPATIENT)
Dept: PSYCHIATRY | Facility: CLINIC | Age: 49
End: 2024-11-06
Payer: COMMERCIAL

## 2024-11-06 DIAGNOSIS — F41.1 GAD (GENERALIZED ANXIETY DISORDER): ICD-10-CM

## 2024-11-06 DIAGNOSIS — F40.01 PANIC DISORDER WITH AGORAPHOBIA: ICD-10-CM

## 2024-11-06 DIAGNOSIS — F33.2 MAJOR DEPRESSIVE DISORDER, RECURRENT SEVERE WITHOUT PSYCHOTIC FEATURES (HCC): Primary | ICD-10-CM

## 2024-11-06 DIAGNOSIS — G47.00 INSOMNIA, UNSPECIFIED TYPE: ICD-10-CM

## 2024-11-06 PROCEDURE — 90833 PSYTX W PT W E/M 30 MIN: CPT | Performed by: PSYCHIATRY & NEUROLOGY

## 2024-11-06 PROCEDURE — 99214 OFFICE O/P EST MOD 30 MIN: CPT | Performed by: PSYCHIATRY & NEUROLOGY

## 2024-11-06 RX ORDER — HYDROXYZINE HCL 10 MG/5 ML
10 SOLUTION, ORAL ORAL 3 TIMES DAILY PRN
Qty: 118 ML | Refills: 2 | Status: SHIPPED | OUTPATIENT
Start: 2024-11-06

## 2024-11-06 NOTE — ASSESSMENT & PLAN NOTE
Unstable.  Discontinued Remeron 7.5 mg QHS, patient did not take it due to worries about potential side effects.   Start hydroxyzine syrup 2-4 mg TID PRN for insomnia and anxiety  Continue CBT-I  shantanu  Supportive therapy during med management visits.  Encouraged complementary treatments such as chamomile, lavender, exercise, meditation, and eating less processed foods.

## 2024-11-06 NOTE — ASSESSMENT & PLAN NOTE
Start hydroxyzine syrup 2-4 mg TID PRN for insomnia and anxiety  Patient has access to Xanax, last Rx 4/6/24 and propranolol 10 mg daily PRN for anxiety.  Supportive therapy during med management visits.  Encouraged complementary treatments such as chamomile, lavender, exercise, meditation, and eating less processed foods.
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
## 2024-11-06 NOTE — ASSESSMENT & PLAN NOTE
Unstable. TIMOTHY-7: 18->16->18.  Start hydroxyzine syrup 2-4 mg TID PRN for insomnia and anxiety  Discontinued Remeron 7.5 mg QHS, patient did not take it due to worries about potential side effects.   Patient has access to Xanax, last Rx 4/6/24 and propranolol 10 mg daily PRN for anxiety.  Supportive therapy during med management visits.  Encouraged complementary treatments such as chamomile, lavender, exercise, meditation, and eating less processed foods.

## 2024-11-06 NOTE — ASSESSMENT & PLAN NOTE
Unstable. PHQ-9: 18->18->19.  Discontinued Remeron 7.5 mg QHS, patient did not take it due to worries about potential side effects.   Supportive therapy during med management visits.  Encouraged complementary treatments such as chamomile, lavender, exercise, meditation, and eating less processed foods.    DISPLAY PLAN FREE TEXT

## 2024-11-25 ENCOUNTER — TELEPHONE (OUTPATIENT)
Age: 49
End: 2024-11-25

## 2024-11-25 NOTE — TELEPHONE ENCOUNTER
Patient is calling regarding cancelling an appointment.    Date/Time: 11/27/24 at 1pm    Reason: no reason given     Patient was rescheduled: YES [] NO [x]  If yes, when was Patient reschedule for: n/a    Patient requesting call back to reschedule: YES [] NO [x]

## 2024-12-03 ENCOUNTER — OFFICE VISIT (OUTPATIENT)
Age: 49
End: 2024-12-03

## 2024-12-03 VITALS
SYSTOLIC BLOOD PRESSURE: 138 MMHG | HEIGHT: 72 IN | BODY MASS INDEX: 20.64 KG/M2 | DIASTOLIC BLOOD PRESSURE: 78 MMHG | OXYGEN SATURATION: 98 % | TEMPERATURE: 97.8 F | HEART RATE: 87 BPM | WEIGHT: 152.4 LBS | RESPIRATION RATE: 16 BRPM

## 2024-12-03 DIAGNOSIS — J06.9 UPPER RESPIRATORY TRACT INFECTION, UNSPECIFIED TYPE: Primary | ICD-10-CM

## 2024-12-03 DIAGNOSIS — R09.89 CHEST CONGESTION: ICD-10-CM

## 2024-12-03 DIAGNOSIS — F33.2 MAJOR DEPRESSIVE DISORDER, RECURRENT SEVERE WITHOUT PSYCHOTIC FEATURES (HCC): ICD-10-CM

## 2024-12-03 PROCEDURE — 99214 OFFICE O/P EST MOD 30 MIN: CPT | Performed by: STUDENT IN AN ORGANIZED HEALTH CARE EDUCATION/TRAINING PROGRAM

## 2024-12-03 RX ORDER — CLINDAMYCIN HYDROCHLORIDE 300 MG/1
300 CAPSULE ORAL 4 TIMES DAILY
Qty: 28 CAPSULE | Refills: 0 | Status: SHIPPED | OUTPATIENT
Start: 2024-12-03 | End: 2024-12-06

## 2024-12-03 RX ORDER — GUAIFENESIN 600 MG/1
1200 TABLET, EXTENDED RELEASE ORAL EVERY 12 HOURS SCHEDULED
Qty: 20 TABLET | Refills: 0 | Status: SHIPPED | OUTPATIENT
Start: 2024-12-03 | End: 2024-12-08

## 2024-12-03 NOTE — ASSESSMENT & PLAN NOTE
Depression Screening Follow-up Plan: Patient's depression screening was positive with a PHQ-9 score of 16. Continue regular follow-up with their psychologist/therapist/psychiatrist who is managing their mental health condition(s).       Positive depression screen during today's appointment, patient follows regularly with therapy and psychiatry.  Patient overall reports that he has been doing better with therapy and management per psychiatry.

## 2024-12-03 NOTE — PROGRESS NOTES
Name: Devon Rollins Jr.      : 1975      MRN: [id number]  Encounter Provider: Tomasz Catherine MD  Encounter Date: 12/3/2024   Encounter department: [facility]  :  Assessment & Plan  Upper respiratory tract infection, unspecified type    Orders:    clindamycin (CLEOCIN) 300 MG capsule; Take 1 capsule (300 mg total) by mouth 4 (four) times a day for 7 days  Given duration and persistence of symptoms, clindamycin sent to pharmacy as indicated above as patient reports ports response to amoxicillin.  Recommended supportive care as well, including rest, plenty of hydration, and over-the-counter medicine as needed.  Advised patient to follow-up with office if symptoms worsen or fail to improve.  Chest congestion    Orders:    guaiFENesin (MUCINEX) 600 mg 12 hr tablet; Take 2 tablets (1,200 mg total) by mouth every 12 (twelve) hours for 5 days    Major depressive disorder, recurrent severe without psychotic features (HCC)  Depression Screening Follow-up Plan: Patient's depression screening was positive with a PHQ-9 score of 16. Continue regular follow-up with their psychologist/therapist/psychiatrist who is managing their mental health condition(s).       Positive depression screen during today's appointment, patient follows regularly with therapy and psychiatry.  Patient overall reports that he has been doing better with therapy and management per psychiatry.         History of Present Illness     URI   This is a new problem. The current episode started 1 to 4 weeks ago. The problem has been gradually worsening. There has been no fever. Associated symptoms include congestion, coughing, nausea, rhinorrhea and a sore throat. Pertinent negatives include no abdominal pain, diarrhea, dysuria, headaches, sinus pain, sneezing or vomiting. He has tried NSAIDs (saline rinse) for the symptoms. The treatment provided mild relief.     Review of Systems   HENT:  Positive for congestion,  rhinorrhea and sore throat. Negative for sinus pain and sneezing.    Respiratory:  Positive for cough.    Gastrointestinal:  Positive for nausea. Negative for abdominal pain, diarrhea and vomiting.   Genitourinary:  Negative for dysuria.   Neurological:  Negative for headaches.          Objective   There were no vitals taken for this visit.     Physical Exam  Constitutional:       Appearance: Normal appearance.   HENT:      Head: Normocephalic and atraumatic.   Cardiovascular:      Rate and Rhythm: Normal rate and regular rhythm.      Heart sounds: Normal heart sounds. No murmur heard.  Pulmonary:      Breath sounds: Normal breath sounds. No wheezing.   Abdominal:      Palpations: Abdomen is soft.      Tenderness: There is no abdominal tenderness. There is no guarding or rebound.   Musculoskeletal:      Right lower leg: No edema.      Left lower leg: No edema.   Neurological:      Mental Status: He is alert and oriented to person, place, and time.   Psychiatric:         Mood and Affect: Mood normal.         Behavior: Behavior normal.       Depression Screening Follow-up Plan: Patient's depression screening was positive with a PHQ-2 score of . Their PHQ-9 score was 16. Continue regular follow-up with their psychologist/therapist/psychiatrist who is managing their mental health condition(s).

## 2024-12-03 NOTE — PATIENT INSTRUCTIONS
"Patient Education     Depression in adults   The Basics   Written by the doctors and editors at [facility]   What is depression? -- Depression is a disorder that makes you sad, but it is different from normal sadness. Depression can make it hard for you to work, study, or do everyday tasks.  What causes depression? -- Depression is caused by problems with chemicals in the brain called \"neurotransmitters.\" Some people might be more likely to have depression if it runs in their family. Other things might also play a role, including hormones, certain health problems, medicines, stress, being mistreated as a child, family problems, and problems with friends or at school or work.  How do I know if I am depressed? -- People with depression feel down most of the time for at least 2 weeks. They also have at least 1 of these 2 symptoms:   They no longer enjoy or care about doing the things that they used to like to do.   They feel sad, down, hopeless, or cranky most of the day, almost every day.  People with depression can also have other symptoms. Examples include:   Changes in your appetite or weight. You might eat too little or too much, or gain or lose weight without trying.   Sleeping too much or too little   Feeling tired or like you have no energy   Feeling guilty, helpless, or like you are worth nothing   Trouble with concentration or memory   Acting restless or have trouble staying still, or moving or speaking more slowly than normal   Repeated thoughts of death or killing yourself  If you think that you might be depressed, see your doctor or nurse. Only someone trained in mental health can tell for sure if you are depressed.  How is depression diagnosed? -- Your doctor or nurse will do a physical exam, ask you questions, and might order tests. Depression can have a big impact on your life. Luckily, depression can be treated, and the sooner treatment is started, the better it works.  Get help right away if you are " "thinking of hurting or killing yourself! -- If you ever feel like you might hurt yourself or someone else, help is available:   In the [place], contact the 640 Suicide & Crisis Lifeline:   To speak to someone, call or text 622.   To talk to someone online, go to www.Skysheet.org/chat.   Call your doctor or nurse, and tell them it is urgent.   Call for an ambulance (in the [place] and [place], call 9-1-1).   Go to the emergency department at the nearest hospital.  What are the treatments for depression? -- Your doctor or nurse will work with you to make a treatment plan. Treatment can include:   Helping you learn more about depression   Counseling (with a psychiatrist, psychologist, nurse, or )   Medicines that relieve depression   Creating a plan to limit access to items that you might use to harm yourself   Other treatments that pass magnetic waves or electricity into the brain  In addition to treatment, getting regular physical activity can also help you feel better.  People with depression that is not too severe can get better by taking medicines or talking with a counselor. People with severe depression usually need medicines to get better, and might also need to see a counselor.  Another treatment involves placing a device against the scalp to pass magnetic waves into the brain. This is called \"transcranial magnetic stimulation\" (\"TMS\"). Doctors might suggest TMS if medicines and counseling have not helped.  Some people with severe depression might need a treatment called \"electroconvulsive therapy\" (\"ECT\"). During ECT, doctors pass an electric current through a person's brain in a safe way.  When will I feel better? -- Most treatment options take a little while to start working.   Many people who take medicines start to feel better within 2 weeks, but it might be 4 to 8 weeks before the medicine has its full effect.   Many people who see a counselor start to feel better within a few weeks, but it might " take 8 to 10 weeks to get the greatest benefit.  If the first treatment you try does not help you, tell your doctor or nurse, but do not give up. Some people need to try different treatments or combinations of treatments before they find an approach that works. Your doctor, nurse, or counselor can work with you to find the treatment that is right for you. They can also help you figure out how to cope while you search for the right treatment or are waiting for your treatment to start working.  How do I decide which treatment to have? -- You and your doctor or nurse will need to work together to choose a treatment for you. Medicines might work a little faster than counseling. But medicines can also cause side effects. Plus, some people do not like the idea of taking medicine.  Seeing a counselor involves talking about your feelings. That is also hard for some people.  What if I take medicine for depression and I want to have a baby? -- Some depression medicines can cause problems for an unborn baby. But having untreated depression during pregnancy can also cause problems. If you want to get pregnant, tell your doctor but do not stop taking your medicines. Together, you can plan the safest way for you to have your baby.  It's also important to talk with your doctor if you want to breastfeed after your baby is born. Breastfeeding has lots of benefits for both mother and baby. Some depression medicines are safer than others to use while breastfeeding. But having untreated depression after giving birth can also cause problems, so do not stop taking your medicines. Your doctor can work with you to plan the safest way for you to feed your baby.  All topics are updated as new evidence becomes available and our peer review process is complete.  This topic retrieved from [employer] on: Mar 06, 2024.  Topic 59457 Version 22.0  Release: 32.2.4 - C32.64  © 2024 UpToDate, Inc. and/or its affiliates. All rights reserved.  figure 1:  "Mood disorders caused by problems in the brain     Mooddisorders, such as depression and bipolar disorder, are caused by problems with\"neurotransmitters.\" These are chemicals in the brain that can affect your emotions.Treatments for mood disorders seem to work by changing the levels of certainneurotransmitters.  Graphic 38556 Version 4.0  Consumer Information Use and Disclaimer   Disclaimer: This generalized information is a limited summary of diagnosis, treatment, and/or medication information. It is not meant to be comprehensive and should be used as a tool to help the user understand and/or assess potential diagnostic and treatment options. It does NOT include all information about conditions, treatments, medications, side effects, or risks that may apply to a specific patient. It is not intended to be medical advice or a substitute for the medical advice, diagnosis, or treatment of a health care provider based on the health care provider's examination and assessment of a patient's specific and unique circumstances. Patients must speak with a health care provider for complete information about their health, medical questions, and treatment options, including any risks or benefits regarding use of medications. This information does not endorse any treatments or medications as safe, effective, or approved for treating a specific patient. UpToDate, Inc. and its affiliates disclaim any warranty or liability relating to this information or the use thereof.The use of this information is governed by the Terms of Use, available at https://www.brick&mobileuwer.com/en/know/clinical-effectiveness-terms. 2024© UpToDate, Inc. and its affiliates and/or licensors. All rights reserved.  Copyright   © 2024 UpToDate, Inc. and/or its affiliates. All rights reserved.    "

## 2024-12-06 ENCOUNTER — TELEPHONE (OUTPATIENT)
Age: 49
End: 2024-12-06

## 2024-12-06 ENCOUNTER — TELEMEDICINE (OUTPATIENT)
Dept: PSYCHIATRY | Facility: CLINIC | Age: 49
End: 2024-12-06

## 2024-12-06 DIAGNOSIS — F33.2 MAJOR DEPRESSIVE DISORDER, RECURRENT SEVERE WITHOUT PSYCHOTIC FEATURES (HCC): Primary | ICD-10-CM

## 2024-12-06 DIAGNOSIS — F42.2 MIXED OBSESSIONAL THOUGHTS AND ACTS: ICD-10-CM

## 2024-12-06 DIAGNOSIS — J06.9 UPPER RESPIRATORY TRACT INFECTION, UNSPECIFIED TYPE: Primary | ICD-10-CM

## 2024-12-06 DIAGNOSIS — F41.1 GAD (GENERALIZED ANXIETY DISORDER): ICD-10-CM

## 2024-12-06 DIAGNOSIS — G47.00 INSOMNIA, UNSPECIFIED TYPE: ICD-10-CM

## 2024-12-06 DIAGNOSIS — F40.01 PANIC DISORDER WITH AGORAPHOBIA: ICD-10-CM

## 2024-12-06 PROCEDURE — 99214 OFFICE O/P EST MOD 30 MIN: CPT | Performed by: PSYCHIATRY & NEUROLOGY

## 2024-12-06 RX ORDER — AZITHROMYCIN 250 MG/1
TABLET, FILM COATED ORAL
Qty: 6 TABLET | Refills: 0 | Status: SHIPPED | OUTPATIENT
Start: 2024-12-06 | End: 2024-12-11

## 2024-12-06 NOTE — TELEPHONE ENCOUNTER
Patient contacted the office this afternoon in regards to medication. 12/03/24 was prescribed clindamycin (CLEOCIN) 300 MG capsule, has been upsetting patient's stomach. Asking if an alternative medication could be prescribed, discussed possible Zithromax, asking if that is still an option or different medication at this time, please send to [facility] on [address]. Please contact patient back with an update, thank you.
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
## 2024-12-06 NOTE — TELEPHONE ENCOUNTER
Pt received automated message and hit C instead of 1. He verified everything is ok for his Virtual appt with provider Viktoria Ramires. Writer messaged provider and she was running behind. Writer checked schedule and everything was still in place and informed pt of both.

## 2024-12-13 NOTE — ASSESSMENT & PLAN NOTE
Continue hydroxyzine syrup 2-5 mg QHS for insomnia, anxiety; was taking weekly, encouraged pt to take smaller dose more frequently for consistent full sleep  Continue CBT-I  shantanu   Encouraged complementary treatments such as chamomile, lavender, exercise, meditation, and eating less processed foods.

## 2024-12-13 NOTE — ASSESSMENT & PLAN NOTE
Unstable.  Does not want to start preventative medications (SSRI/SNRI/TCAs/atypical antidepressants) at this time due to concern of side effects  Supportive therapy during med management visits.  Encouraged complementary treatments such as chamomile, lavender, exercise, meditation, and eating less processed foods.

## 2024-12-13 NOTE — ASSESSMENT & PLAN NOTE
Does not want to start preventative medications (SSRI/SNRI/TCAs/atypical antidepressants) at this time due to concern of side effects  Continue hydroxyzine syrup 2-5 mg QHS for insomnia, anxiety; was taking weekly, encouraged pt to take smaller dose more frequently for consistent full sleep  Supportive therapy during med management visits.  Encouraged complementary treatments such as chamomile, lavender, exercise, meditation, and eating less processed foods.

## 2024-12-20 ENCOUNTER — TELEPHONE (OUTPATIENT)
Age: 49
End: 2024-12-20

## 2024-12-20 ENCOUNTER — TELEPHONE (OUTPATIENT)
Dept: BEHAVIORAL/MENTAL HEALTH CLINIC | Facility: CLINIC | Age: 49
End: 2024-12-20

## 2024-12-20 DIAGNOSIS — F41.1 GAD (GENERALIZED ANXIETY DISORDER): Primary | ICD-10-CM

## 2024-12-20 NOTE — TELEPHONE ENCOUNTER
Pt called to verify the cancellation from Kelly Carver. Writer verified provider is on vacation. Pt mentioned he will hold off on this appt because he has no insurance and is in between jobs. Writer informed he has no appt with Viktoria Ramires. Writer scheduled appt for 1/6/25 at 11 am.

## 2024-12-24 ENCOUNTER — OFFICE VISIT (OUTPATIENT)
Age: 49
End: 2024-12-24

## 2024-12-24 VITALS
BODY MASS INDEX: 20.32 KG/M2 | HEIGHT: 72 IN | DIASTOLIC BLOOD PRESSURE: 80 MMHG | WEIGHT: 150 LBS | SYSTOLIC BLOOD PRESSURE: 118 MMHG | RESPIRATION RATE: 16 BRPM | TEMPERATURE: 97.9 F | OXYGEN SATURATION: 98 % | HEART RATE: 92 BPM

## 2024-12-24 DIAGNOSIS — R09.82 POSTNASAL DRIP: ICD-10-CM

## 2024-12-24 DIAGNOSIS — F33.2 MODERATELY SEVERE RECURRENT MAJOR DEPRESSION (HCC): ICD-10-CM

## 2024-12-24 DIAGNOSIS — J02.9 SORE THROAT: Primary | ICD-10-CM

## 2024-12-24 PROCEDURE — 87070 CULTURE OTHR SPECIMN AEROBIC: CPT | Performed by: STUDENT IN AN ORGANIZED HEALTH CARE EDUCATION/TRAINING PROGRAM

## 2024-12-24 PROCEDURE — 99214 OFFICE O/P EST MOD 30 MIN: CPT | Performed by: STUDENT IN AN ORGANIZED HEALTH CARE EDUCATION/TRAINING PROGRAM

## 2024-12-24 RX ORDER — FLUTICASONE PROPIONATE 50 MCG
1 SPRAY, SUSPENSION (ML) NASAL DAILY
Qty: 16 G | Refills: 0 | Status: SHIPPED | OUTPATIENT
Start: 2024-12-24

## 2024-12-24 NOTE — PATIENT INSTRUCTIONS
"Patient Education     Depression in adults   The Basics   Written by the doctors and editors at [facility]   What is depression? -- Depression is a disorder that makes you sad, but it is different from normal sadness. Depression can make it hard for you to work, study, or do everyday tasks.  What causes depression? -- Depression is caused by problems with chemicals in the brain called \"neurotransmitters.\" Some people might be more likely to have depression if it runs in their family. Other things might also play a role, including hormones, certain health problems, medicines, stress, being mistreated as a child, family problems, and problems with friends or at school or work.  How do I know if I am depressed? -- People with depression feel down most of the time for at least 2 weeks. They also have at least 1 of these 2 symptoms:   They no longer enjoy or care about doing the things that they used to like to do.   They feel sad, down, hopeless, or cranky most of the day, almost every day.  People with depression can also have other symptoms. Examples include:   Changes in your appetite or weight. You might eat too little or too much, or gain or lose weight without trying.   Sleeping too much or too little   Feeling tired or like you have no energy   Feeling guilty, helpless, or like you are worth nothing   Trouble with concentration or memory   Acting restless or have trouble staying still, or moving or speaking more slowly than normal   Repeated thoughts of death or killing yourself  If you think that you might be depressed, see your doctor or nurse. Only someone trained in mental health can tell for sure if you are depressed.  How is depression diagnosed? -- Your doctor or nurse will do a physical exam, ask you questions, and might order tests. Depression can have a big impact on your life. Luckily, depression can be treated, and the sooner treatment is started, the better it works.  Get help right away if you are " "thinking of hurting or killing yourself! -- If you ever feel like you might hurt yourself or someone else, help is available:   In the [place], contact the 267 Suicide & Crisis Lifeline:   To speak to someone, call or text 971.   To talk to someone online, go to www.TRAKLOK.org/chat.   Call your doctor or nurse, and tell them it is urgent.   Call for an ambulance (in the [place] and [place], call 9-1-1).   Go to the emergency department at the nearest hospital.  What are the treatments for depression? -- Your doctor or nurse will work with you to make a treatment plan. Treatment can include:   Helping you learn more about depression   Counseling (with a psychiatrist, psychologist, nurse, or )   Medicines that relieve depression   Creating a plan to limit access to items that you might use to harm yourself   Other treatments that pass magnetic waves or electricity into the brain  In addition to treatment, getting regular physical activity can also help you feel better.  People with depression that is not too severe can get better by taking medicines or talking with a counselor. People with severe depression usually need medicines to get better, and might also need to see a counselor.  Another treatment involves placing a device against the scalp to pass magnetic waves into the brain. This is called \"transcranial magnetic stimulation\" (\"TMS\"). Doctors might suggest TMS if medicines and counseling have not helped.  Some people with severe depression might need a treatment called \"electroconvulsive therapy\" (\"ECT\"). During ECT, doctors pass an electric current through a person's brain in a safe way.  When will I feel better? -- Most treatment options take a little while to start working.   Many people who take medicines start to feel better within 2 weeks, but it might be 4 to 8 weeks before the medicine has its full effect.   Many people who see a counselor start to feel better within a few weeks, but it might " take 8 to 10 weeks to get the greatest benefit.  If the first treatment you try does not help you, tell your doctor or nurse, but do not give up. Some people need to try different treatments or combinations of treatments before they find an approach that works. Your doctor, nurse, or counselor can work with you to find the treatment that is right for you. They can also help you figure out how to cope while you search for the right treatment or are waiting for your treatment to start working.  How do I decide which treatment to have? -- You and your doctor or nurse will need to work together to choose a treatment for you. Medicines might work a little faster than counseling. But medicines can also cause side effects. Plus, some people do not like the idea of taking medicine.  Seeing a counselor involves talking about your feelings. That is also hard for some people.  What if I take medicine for depression and I want to have a baby? -- Some depression medicines can cause problems for an unborn baby. But having untreated depression during pregnancy can also cause problems. If you want to get pregnant, tell your doctor but do not stop taking your medicines. Together, you can plan the safest way for you to have your baby.  It's also important to talk with your doctor if you want to breastfeed after your baby is born. Breastfeeding has lots of benefits for both mother and baby. Some depression medicines are safer than others to use while breastfeeding. But having untreated depression after giving birth can also cause problems, so do not stop taking your medicines. Your doctor can work with you to plan the safest way for you to feed your baby.  All topics are updated as new evidence becomes available and our peer review process is complete.  This topic retrieved from [employer] on: Mar 06, 2024.  Topic 65682 Version 22.0  Release: 32.2.4 - C32.64  © 2024 UpToDate, Inc. and/or its affiliates. All rights reserved.  figure 1:  "Mood disorders caused by problems in the brain     Mooddisorders, such as depression and bipolar disorder, are caused by problems with\"neurotransmitters.\" These are chemicals in the brain that can affect your emotions.Treatments for mood disorders seem to work by changing the levels of certainneurotransmitters.  Graphic 05263 Version 4.0  Consumer Information Use and Disclaimer   Disclaimer: This generalized information is a limited summary of diagnosis, treatment, and/or medication information. It is not meant to be comprehensive and should be used as a tool to help the user understand and/or assess potential diagnostic and treatment options. It does NOT include all information about conditions, treatments, medications, side effects, or risks that may apply to a specific patient. It is not intended to be medical advice or a substitute for the medical advice, diagnosis, or treatment of a health care provider based on the health care provider's examination and assessment of a patient's specific and unique circumstances. Patients must speak with a health care provider for complete information about their health, medical questions, and treatment options, including any risks or benefits regarding use of medications. This information does not endorse any treatments or medications as safe, effective, or approved for treating a specific patient. UpToDate, Inc. and its affiliates disclaim any warranty or liability relating to this information or the use thereof.The use of this information is governed by the Terms of Use, available at https://www.Amte[employer].com/en/know/clinical-effectiveness-terms. 2024© UpToDate, Inc. and its affiliates and/or licensors. All rights reserved.  Copyright   © 2024 UpToDate, Inc. and/or its affiliates. All rights reserved.    "

## 2024-12-24 NOTE — PROGRESS NOTES
Name: Devon Rollins Jr.      : 1975      MRN: [id number]  Encounter Provider: Tomasz Catherine MD  Encounter Date: 2024   Encounter department: [facility]  :  Assessment & Plan  Sore throat    Orders:  •  Throat culture; Future  Advised patient to continue with salt water gargles as well as over-the-counter treatment as needed.  Recommended supportive care, including rest, plan of hydration, and over-the-counter medicine as needed.  Throat culture ordered at today's visit, will follow-up results with patient.  Patient did complete course of antibiotics, however advised patient if symptoms worsen or fail to resolve can trial another round.  Patient expressed understanding.  Postnasal drip    Orders:  •  fluticasone (FLONASE) 50 mcg/act nasal spray; 1 spray into each nostril daily    Moderately severe recurrent major depression (HCC)  Depression Screening Follow-up Plan: Patient's depression screening was positive with a PHQ-9 score of 17. Continue regular follow-up with their psychologist/therapist/psychiatrist who is managing their mental health condition(s).                History of Present Illness     URI   This is a recurrent problem. The current episode started 1 to 4 weeks ago. The problem has been waxing and waning. There has been no fever. Associated symptoms include congestion, coughing, nausea and a sore throat. Pertinent negatives include no abdominal pain, chest pain, diarrhea, dysuria, headaches or vomiting.     Review of Systems   Constitutional:  Negative for chills and fever.   HENT:  Positive for congestion and sore throat.    Respiratory:  Positive for cough. Negative for shortness of breath.    Cardiovascular:  Negative for chest pain and palpitations.   Gastrointestinal:  Positive for nausea. Negative for abdominal pain, constipation, diarrhea and vomiting.   Genitourinary:  Negative for difficulty urinating and dysuria.   Neurological:  Negative for  dizziness and headaches.       Objective   /80 (BP Location: Left arm, Patient Position: Sitting, Cuff Size: Standard)   Pulse 92   Temp 97.9 °F (36.6 °C) (Temporal)   Resp 16   Ht 6' (1.829 m)   Wt 68 kg (150 lb)   SpO2 98%   BMI 20.34 kg/m²      Physical Exam  Constitutional:       General: He is not in acute distress.     Appearance: Normal appearance. He is not ill-appearing.   HENT:      Head: Normocephalic and atraumatic.      Right Ear: Ear canal normal. There is no impacted cerumen. Tympanic membrane is bulging.      Left Ear: Ear canal normal. There is no impacted cerumen. Tympanic membrane is bulging.      Nose: Nose normal. No congestion or rhinorrhea.      Mouth/Throat:      Mouth: Mucous membranes are moist.      Pharynx: Oropharynx is clear. Posterior oropharyngeal erythema present. No oropharyngeal exudate.   Eyes:      General:         Right eye: No discharge.         Left eye: No discharge.      Conjunctiva/sclera: Conjunctivae normal.      Pupils: Pupils are equal, round, and reactive to light.   Cardiovascular:      Rate and Rhythm: Normal rate and regular rhythm.      Heart sounds: Normal heart sounds. No murmur heard.  Pulmonary:      Breath sounds: Normal breath sounds. No wheezing.   Abdominal:      Palpations: Abdomen is soft.      Tenderness: There is no abdominal tenderness. There is no guarding or rebound.   Musculoskeletal:      Right lower leg: No edema.      Left lower leg: No edema.   Neurological:      Mental Status: He is alert and oriented to person, place, and time.   Psychiatric:         Mood and Affect: Mood normal.         Behavior: Behavior normal.     Depression Screening Follow-up Plan: Patient's depression screening was positive with a PHQ-9 score of 17. Continue regular follow-up with their psychologist/therapist/psychiatrist who is managing their mental health condition(s).

## 2024-12-26 LAB — BACTERIA THROAT CULT: NORMAL

## 2025-01-06 ENCOUNTER — TELEMEDICINE (OUTPATIENT)
Dept: PSYCHIATRY | Facility: CLINIC | Age: 50
End: 2025-01-06

## 2025-01-06 DIAGNOSIS — G47.00 INSOMNIA, UNSPECIFIED TYPE: ICD-10-CM

## 2025-01-06 DIAGNOSIS — F42.2 MIXED OBSESSIONAL THOUGHTS AND ACTS: ICD-10-CM

## 2025-01-06 DIAGNOSIS — F41.1 GAD (GENERALIZED ANXIETY DISORDER): ICD-10-CM

## 2025-01-06 DIAGNOSIS — F40.01 PANIC DISORDER WITH AGORAPHOBIA: ICD-10-CM

## 2025-01-06 DIAGNOSIS — F33.2 MAJOR DEPRESSIVE DISORDER, RECURRENT SEVERE WITHOUT PSYCHOTIC FEATURES (HCC): Primary | ICD-10-CM

## 2025-01-06 PROBLEM — F32.2 MODERATELY SEVERE MAJOR DEPRESSION (HCC): Status: RESOLVED | Noted: 2024-09-25 | Resolved: 2025-01-06

## 2025-01-06 PROCEDURE — 99214 OFFICE O/P EST MOD 30 MIN: CPT | Performed by: PSYCHIATRY & NEUROLOGY
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
## 2025-01-13 NOTE — BH TREATMENT PLAN
TREATMENT PLAN (Medication Management Only)        [facility]    Name and Date of Birth:  Devon Rollins Jr. 49 y.o. 1975  Date of Treatment Plan: January 13, 2025  Diagnosis/Diagnoses:    1. Major depressive disorder, recurrent severe without psychotic features (HCC)    2. TIMOTHY (generalized anxiety disorder)    3. Panic disorder with agoraphobia    4. Insomnia, unspecified type    5. Mixed obsessional thoughts and acts      Strengths/Personal Resources for Self-Care: ability to communicate needs, ability to communicate well, ability to listen, ability to reason, ability to understand psychiatric illness, average or above intelligence, family ties, general fund of knowledge, motivation for treatment, willingness to work on problems.  Area/Areas of need (in own words): anxiety, depression, sleep problems  1. Long Term Goal: improve control of anxiety.  Target Date:6 months - 7/13/2025  Person/Persons responsible for completion of goal: Devon  2.  Short Term Objective (s) - How will we reach this goal?:   A. Provider new recommended medication/dosage changes and/or continue medication(s): continue current medications as prescribed.  B. N/A.  C. N/A.  Target Date:6 months - 7/13/2025  Person/Persons Responsible for Completion of Goal: Devon  Progress Towards Goals: continuing treatment  Treatment Modality: medication management with psychotherapy every 1 month  Review due 180 days from date of this plan: 6 months - 7/13/2025  Expected length of service: ongoing treatment  My Physician/PA/NP and I have developed this plan together and I agree to work on the goals and objectives. I understand the treatment goals that were developed for my treatment.

## 2025-01-13 NOTE — ASSESSMENT & PLAN NOTE
Does not want to start preventative medications (SSRI/SNRI/TCAs/atypical antidepressants) at this time due to concern of side effects

## 2025-02-04 ENCOUNTER — TELEMEDICINE (OUTPATIENT)
Dept: PSYCHIATRY | Facility: CLINIC | Age: 50
End: 2025-02-04

## 2025-02-04 DIAGNOSIS — F42.2 MIXED OBSESSIONAL THOUGHTS AND ACTS: ICD-10-CM

## 2025-02-04 DIAGNOSIS — G47.00 INSOMNIA, UNSPECIFIED TYPE: ICD-10-CM

## 2025-02-04 DIAGNOSIS — F40.01 PANIC DISORDER WITH AGORAPHOBIA: ICD-10-CM

## 2025-02-04 DIAGNOSIS — F33.2 MAJOR DEPRESSIVE DISORDER, RECURRENT SEVERE WITHOUT PSYCHOTIC FEATURES (HCC): Primary | ICD-10-CM

## 2025-02-04 DIAGNOSIS — F41.1 GAD (GENERALIZED ANXIETY DISORDER): ICD-10-CM

## 2025-02-04 PROCEDURE — 99214 OFFICE O/P EST MOD 30 MIN: CPT | Performed by: PSYCHIATRY & NEUROLOGY

## 2025-02-04 RX ORDER — LORAZEPAM 0.5 MG/1
0.5 TABLET ORAL DAILY PRN
Qty: 5 TABLET | Refills: 0 | Status: SHIPPED | OUTPATIENT
Start: 2025-02-04

## 2025-02-04 NOTE — ASSESSMENT & PLAN NOTE
Does not want to start preventative medications (SSRI/SNRI/TCAs/atypical antidepressants) at this time due to concern of side effects  Supportive therapy during med management visits.  Encouraged complementary treatments such as chamomile, lavender, exercise, meditation, and eating less processed foods.
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
## 2025-02-19 ENCOUNTER — DOCUMENTATION (OUTPATIENT)
Dept: BEHAVIORAL/MENTAL HEALTH CLINIC | Facility: CLINIC | Age: 50
End: 2025-02-19

## 2025-02-19 DIAGNOSIS — F41.1 GAD (GENERALIZED ANXIETY DISORDER): Primary | ICD-10-CM

## 2025-02-19 DIAGNOSIS — F32.2 MODERATELY SEVERE MAJOR DEPRESSION (HCC): ICD-10-CM

## 2025-02-19 DIAGNOSIS — F40.01 PANIC DISORDER WITH AGORAPHOBIA: ICD-10-CM

## 2025-02-19 NOTE — PROGRESS NOTES
"Psychotherapy Discharge Summary    Preferred Name: Devon Rollins Jr.  YOB: 1975    Admission date to psychotherapy: 05/31/24    Referred by: PCP    Presenting Problem: Devon initially presented endorsing severe anxiety, remarkable cognitive distortions and depressed mood. He has tried to cope by being outside, engaging in sports activities. There is extreme isolation, difficulty concentrating, feelings of inadequacy. Panic attacks episodes started at age 18th. Perceived world as danger, cancelling activities, refusing to attend crowed places or events. There is impaired sleep patterns, at times only sleeping 4-5 hours, other times does not sleep at all. There is trouble concentrating as well. Reported \"anxiety is my bad thing\", impacting self confidence and his ability to socialize or being around loved ones, cancelling family events as well. Noticed his socialization has changed in the last five years, feeling uncomfortable on diverse settings.  Most significant symptoms are racing thoughts, heart rate, ruminating thoughts, worrying. Devon experienced OCD, worsen when anxiety is present.     Course of treatment included : medication management and individual therapy     Progress/Outcome of Treatment Goals (brief summary of course of treatment) Devon attended Intake and 9 individual virtual sessions to address history of maladaptive symptoms, explore triggers and learn effective coping mechanisms to manage maladaptive symptoms. Devon engaged well in treatment, increasing understanding and insight on his mental health. He started medication management to assist normalizing symptoms. Insurance coverage issues prevented the continuation of his therapy appointments.     Treatment Complications (if any): Patient can not continue therapy sessions due lack of insurance, unemployment and financial problems.    Treatment Progress: fair    Current [facility] Provider: Viktoria Montes MD    Discharge " Medications include: Lorazepam 0.5    Discharge Date: 09/25/24    Discharge Diagnosis:   1. TIMOTHY (generalized anxiety disorder)        2. Moderately severe major depression (HCC)        3. Panic disorder with agoraphobia            Criteria for Discharge: Inactive insurance    Aftercare recommendations include (include specific referral names and phone numbers, if appropriate): Revisit his crisis plan to reach out support system, review warning signs and crisis numbers as needed. The therapist recommends Devon to return to therapy in the future if symptoms return. To continue on medication management to stabilize symptoms.     Prognosis: fair

## 2025-02-28 ENCOUNTER — OFFICE VISIT (OUTPATIENT)
Age: 50
End: 2025-02-28

## 2025-02-28 VITALS
TEMPERATURE: 98.1 F | RESPIRATION RATE: 16 BRPM | HEART RATE: 102 BPM | SYSTOLIC BLOOD PRESSURE: 130 MMHG | WEIGHT: 148 LBS | HEIGHT: 72 IN | OXYGEN SATURATION: 98 % | BODY MASS INDEX: 20.05 KG/M2 | DIASTOLIC BLOOD PRESSURE: 82 MMHG

## 2025-02-28 DIAGNOSIS — U07.1 COVID-19: Primary | ICD-10-CM

## 2025-02-28 DIAGNOSIS — F33.2 SEVERE EPISODE OF RECURRENT MAJOR DEPRESSIVE DISORDER, WITHOUT PSYCHOTIC FEATURES (HCC): ICD-10-CM

## 2025-02-28 DIAGNOSIS — R06.2 WHEEZING: ICD-10-CM

## 2025-02-28 DIAGNOSIS — R06.02 SHORTNESS OF BREATH: ICD-10-CM

## 2025-02-28 DIAGNOSIS — R68.89 THROAT CONGESTION: ICD-10-CM

## 2025-02-28 PROBLEM — R03.0 WHITE COAT SYNDROME WITHOUT HYPERTENSION: Status: ACTIVE | Noted: 2025-02-28

## 2025-02-28 PROCEDURE — 99214 OFFICE O/P EST MOD 30 MIN: CPT | Performed by: STUDENT IN AN ORGANIZED HEALTH CARE EDUCATION/TRAINING PROGRAM

## 2025-02-28 RX ORDER — ALBUTEROL SULFATE 90 UG/1
2 INHALANT RESPIRATORY (INHALATION) EVERY 6 HOURS PRN
Qty: 18 G | Refills: 0 | Status: SHIPPED | OUTPATIENT
Start: 2025-02-28 | End: 2025-03-06 | Stop reason: SDUPTHER

## 2025-02-28 NOTE — PROGRESS NOTES
Name: Devon Rollins Jr.      : 1975      MRN: [id number]  Encounter Provider: Tomasz Catherine MD  Encounter Date: 2025   Encounter department: [facility]  :  Assessment & Plan  COVID-19       Explained to patient that since symptoms overall improving, he is likely dealing with residual symptoms following his COVID-19 infection.  Patient expressed understanding.  Albuterol inhaler sent to pharmacy for symptomatic treatment for intermittent episodes of shortness of breath and wheezing.  Patient also continues to complain of lingering throat congestion that has not responded to Flonase and Mucinex that preceded his COVID-19 infection.  Patient is amenable to seeing ENT given persistent of symptoms.  Referral placed at today's visit.  Shortness of breath    Orders:  •  albuterol (Ventolin HFA) 90 mcg/act inhaler; Inhale 2 puffs every 6 (six) hours as needed for wheezing or shortness of breath    Wheezing    Orders:  •  albuterol (Ventolin HFA) 90 mcg/act inhaler; Inhale 2 puffs every 6 (six) hours as needed for wheezing or shortness of breath    Severe episode of recurrent major depressive disorder, without psychotic features (HCC)  Depression Screening Follow-up Plan: Patient's depression screening was positive with a PHQ-9 score of 20.  Patient denies any suicidal ideation.  Continue regular follow-up with their psychologist/therapist/psychiatrist who is managing their mental health condition(s).         Throat congestion    Orders:  •  Ambulatory Referral to Otolaryngology; Future           History of Present Illness   HPI  Patient presenting today to discuss persistent fatigue and congestion following COVID-19 infection 3 weeks ago.  Patient reports that most of his largely resolved however he continues to deal with fatigue and congestion noticed more so in his throat.  Patient reports this is something that did precede the infection as well.  Patient continues visits  with psychiatrist regarding mental health.  Patient otherwise has no complaints.    Review of Systems   Constitutional:  Positive for fatigue. Negative for chills and fever.   HENT:  Positive for congestion. Negative for sore throat.    Respiratory:  Negative for cough and shortness of breath.    Cardiovascular:  Negative for chest pain and palpitations.   Gastrointestinal:  Negative for abdominal pain, constipation, diarrhea, nausea and vomiting.   Genitourinary:  Negative for difficulty urinating and dysuria.   Neurological:  Negative for dizziness and headaches.       Objective   /82 (BP Location: Left arm, Patient Position: Sitting, Cuff Size: Large)   Pulse 102   Temp 98.1 °F (36.7 °C) (Temporal)   Resp 16   Ht 6' (1.829 m)   Wt 67.1 kg (148 lb)   SpO2 98%   BMI 20.07 kg/m²      Physical Exam  Constitutional:       Appearance: Normal appearance.   HENT:      Head: Normocephalic and atraumatic.      Right Ear: Tympanic membrane and ear canal normal. There is no impacted cerumen.      Left Ear: Tympanic membrane and ear canal normal. There is no impacted cerumen.      Nose: Nose normal. No congestion or rhinorrhea.      Mouth/Throat:      Mouth: Mucous membranes are moist.      Pharynx: Oropharynx is clear. Posterior oropharyngeal erythema present. No oropharyngeal exudate.   Eyes:      General:         Right eye: No discharge.         Left eye: No discharge.      Conjunctiva/sclera: Conjunctivae normal.      Pupils: Pupils are equal, round, and reactive to light.   Cardiovascular:      Rate and Rhythm: Normal rate and regular rhythm.      Heart sounds: Normal heart sounds. No murmur heard.  Pulmonary:      Breath sounds: Normal breath sounds. No wheezing.   Abdominal:      Palpations: Abdomen is soft.      Tenderness: There is no abdominal tenderness. There is no guarding or rebound.   Musculoskeletal:      Right lower leg: No edema.      Left lower leg: No edema.   Neurological:      Mental Status:  He is alert and oriented to person, place, and time.   Psychiatric:         Mood and Affect: Mood normal.         Behavior: Behavior normal.       Depression Screening Follow-up Plan: Patient's depression screening was positive with a PHQ-9 score of 20. Continue regular follow-up with their psychologist/therapist/psychiatrist who is managing their mental health condition(s).

## 2025-02-28 NOTE — PATIENT INSTRUCTIONS
"Patient Education     Depression in adults   The Basics   Written by the doctors and editors at [facility]   What is depression? -- Depression is a disorder that makes you sad, but it is different from normal sadness. Depression can make it hard for you to work, study, or do everyday tasks.  What causes depression? -- Depression is caused by problems with chemicals in the brain called \"neurotransmitters.\" Some people might be more likely to have depression if it runs in their family. Other things might also play a role, including hormones, certain health problems, medicines, stress, being mistreated as a child, family problems, and problems with friends or at school or work.  How do I know if I am depressed? -- People with depression feel down most of the time for at least 2 weeks. They also have at least 1 of these 2 symptoms:   They no longer enjoy or care about doing the things that they used to like to do.   They feel sad, down, hopeless, or cranky most of the day, almost every day.  People with depression can also have other symptoms. Examples include:   Changes in your appetite or weight. You might eat too little or too much, or gain or lose weight without trying.   Sleeping too much or too little   Feeling tired or like you have no energy   Feeling guilty, helpless, or like you are worth nothing   Trouble with concentration or memory   Acting restless or have trouble staying still, or moving or speaking more slowly than normal   Repeated thoughts of death or killing yourself  If you think that you might be depressed, see your doctor or nurse. Only someone trained in mental health can tell for sure if you are depressed.  How is depression diagnosed? -- Your doctor or nurse will do a physical exam, ask you questions, and might order tests. Depression can have a big impact on your life. Luckily, depression can be treated, and the sooner treatment is started, the better it works.  Get help right away if you are " "thinking of hurting or killing yourself! -- If you ever feel like you might hurt yourself or someone else, help is available:   In the [place], contact the 109 Suicide & Crisis Lifeline:   To speak to someone, call or text 656.   To talk to someone online, go to www.[employer].org/chat.   Call your doctor or nurse, and tell them it is urgent.   Call for an ambulance (in the [place] and [place], call 9-1-1).   Go to the emergency department at the nearest hospital.  What are the treatments for depression? -- Your doctor or nurse will work with you to make a treatment plan. Treatment can include:   Helping you learn more about depression   Counseling (with a psychiatrist, psychologist, nurse, or )   Medicines that relieve depression   Creating a plan to limit access to items that you might use to harm yourself   Other treatments that pass magnetic waves or electricity into the brain  In addition to treatment, getting regular physical activity can also help you feel better.  People with depression that is not too severe can get better by taking medicines or talking with a counselor. People with severe depression usually need medicines to get better, and might also need to see a counselor.  Another treatment involves placing a device against the scalp to pass magnetic waves into the brain. This is called \"transcranial magnetic stimulation\" (\"TMS\"). Doctors might suggest TMS if medicines and counseling have not helped.  Some people with severe depression might need a treatment called \"electroconvulsive therapy\" (\"ECT\"). During ECT, doctors pass an electric current through a person's brain in a safe way.  When will I feel better? -- Most treatment options take a little while to start working.   Many people who take medicines start to feel better within 2 weeks, but it might be 4 to 8 weeks before the medicine has its full effect.   Many people who see a counselor start to feel better within a few weeks, but it might " take 8 to 10 weeks to get the greatest benefit.  If the first treatment you try does not help you, tell your doctor or nurse, but do not give up. Some people need to try different treatments or combinations of treatments before they find an approach that works. Your doctor, nurse, or counselor can work with you to find the treatment that is right for you. They can also help you figure out how to cope while you search for the right treatment or are waiting for your treatment to start working.  How do I decide which treatment to have? -- You and your doctor or nurse will need to work together to choose a treatment for you. Medicines might work a little faster than counseling. But medicines can also cause side effects. Plus, some people do not like the idea of taking medicine.  Seeing a counselor involves talking about your feelings. That is also hard for some people.  What if I take medicine for depression and I want to have a baby? -- Some depression medicines can cause problems for an unborn baby. But having untreated depression during pregnancy can also cause problems. If you want to get pregnant, tell your doctor but do not stop taking your medicines. Together, you can plan the safest way for you to have your baby.  It's also important to talk with your doctor if you want to breastfeed after your baby is born. Breastfeeding has lots of benefits for both mother and baby. Some depression medicines are safer than others to use while breastfeeding. But having untreated depression after giving birth can also cause problems, so do not stop taking your medicines. Your doctor can work with you to plan the safest way for you to feed your baby.  All topics are updated as new evidence becomes available and our peer review process is complete.  This topic retrieved from [employer] on: Mar 06, 2024.  Topic 83722 Version 22.0  Release: 32.2.4 - C32.64  © 2024 UpToDate, Inc. and/or its affiliates. All rights reserved.  figure 1:  "Mood disorders caused by problems in the brain     Mooddisorders, such as depression and bipolar disorder, are caused by problems with\"neurotransmitters.\" These are chemicals in the brain that can affect your emotions.Treatments for mood disorders seem to work by changing the levels of certainneurotransmitters.  Graphic 98778 Version 4.0  Consumer Information Use and Disclaimer   Disclaimer: This generalized information is a limited summary of diagnosis, treatment, and/or medication information. It is not meant to be comprehensive and should be used as a tool to help the user understand and/or assess potential diagnostic and treatment options. It does NOT include all information about conditions, treatments, medications, side effects, or risks that may apply to a specific patient. It is not intended to be medical advice or a substitute for the medical advice, diagnosis, or treatment of a health care provider based on the health care provider's examination and assessment of a patient's specific and unique circumstances. Patients must speak with a health care provider for complete information about their health, medical questions, and treatment options, including any risks or benefits regarding use of medications. This information does not endorse any treatments or medications as safe, effective, or approved for treating a specific patient. UpToDate, Inc. and its affiliates disclaim any warranty or liability relating to this information or the use thereof.The use of this information is governed by the Terms of Use, available at https://www.[employer].com/en/know/clinical-effectiveness-terms. 2024© UpToDate, Inc. and its affiliates and/or licensors. All rights reserved.  Copyright   © 2024 UpToDate, Inc. and/or its affiliates. All rights reserved.    "

## 2025-03-06 ENCOUNTER — NURSE TRIAGE (OUTPATIENT)
Age: 50
End: 2025-03-06

## 2025-03-06 DIAGNOSIS — R06.2 WHEEZING: ICD-10-CM

## 2025-03-06 DIAGNOSIS — R06.02 SHORTNESS OF BREATH: ICD-10-CM

## 2025-03-06 RX ORDER — ALBUTEROL SULFATE 90 UG/1
2 INHALANT RESPIRATORY (INHALATION) EVERY 6 HOURS PRN
Qty: 18 G | Refills: 0 | Status: SHIPPED | OUTPATIENT
Start: 2025-03-06 | End: 2025-03-11 | Stop reason: CLARIF

## 2025-03-06 NOTE — TELEPHONE ENCOUNTER
"Patient reports that he has thick yellow sinus drainage. Patient was in the office on 2/28/25 and the drainage was clear. Patient is also coughing. Patient denies fever. Patient does report post nasal drip but no sore throat. Patient does report some pain and tenderness around his eyes. Patient does also report mild ear fullness. Patient is asking if any prescriptions can go to [facility] on [facility].   Reason for Disposition   Nasal discharge present > 10 days    Answer Assessment - Initial Assessment Questions  1. LOCATION: \"Where does it hurt?\"       Upper cheeks and around the eyes.  2. ONSET: \"When did the sinus pain start?\"  (e.g., hours, days)       1 week  3. SEVERITY: \"How bad is the pain?\"   (Scale 1-10; mild, moderate or severe)    - MILD (1-3): doesn't interfere with normal activities     - MODERATE (4-7): interferes with normal activities (e.g., work or school) or awakens from sleep    - SEVERE (8-10): excruciating pain and patient unable to do any normal activities         Mild to moderate  4. RECURRENT SYMPTOM: \"Have you ever had sinus problems before?\" If so, ask: \"When was the last time?\" and \"What happened that time?\"       yes  5. NASAL CONGESTION: \"Is the nose blocked?\" If so, ask, \"Can you open it or must you breathe through the mouth?\"      Yes. Significant nasal congestion  6. NASAL DISCHARGE: \"Do you have discharge from your nose?\" If so ask, \"What color?\"      Thick yellow drainage  7. FEVER: \"Do you have a fever?\" If so, ask: \"What is it, how was it measured, and when did it start?\"       denies  8. OTHER SYMPTOMS: \"Do you have any other symptoms?\" (e.g., sore throat, cough, earache, difficulty breathing)      Mild cough, ear fullness facial pain  9. PREGNANCY: \"Is there any chance you are pregnant?\" \"When was your last menstrual period?\"      N/A    Protocols used: Sinus Pain and Congestion-ADULT-[place]  FOLLOW UP: post Covid symptoms    REASON FOR CONVERSATION: Nasal Congestion    SYMPTOMS: " significant nasal congestion that is thick yellow. Patient has facial pain and ear fullness. Patient was seen in the office on 2/28/25. Patient is asking for an antibiotic. Patient was treated for a sinus infection in December 2024. Clindamycin was not effective but Z-pack worked very well.     OTHER: Please send to [id number] - [place] - [address] [facility]   Patient also asked for the Albuterol inhaler prescription to be transferred to [facility]. I was able to complete that request    DISPOSITION: Send Prescription Now and Provide Home Care Advice (overriding See Today or Tomorrow in Office)

## 2025-03-07 ENCOUNTER — TELEPHONE (OUTPATIENT)
Age: 50
End: 2025-03-07

## 2025-03-07 DIAGNOSIS — J01.90 ACUTE BACTERIAL SINUSITIS: Primary | ICD-10-CM

## 2025-03-07 DIAGNOSIS — B96.89 ACUTE BACTERIAL SINUSITIS: Primary | ICD-10-CM

## 2025-03-07 NOTE — TELEPHONE ENCOUNTER
Pt called in with a question on if he can add his pre pay amount to the payment plan he is currently on. Writer was unable to answer the question. Writer provided the billing department phone number for the patient.

## 2025-03-07 NOTE — TELEPHONE ENCOUNTER
Patient called again about his symptoms.  States that symptoms are the same today.  He would like a call back today with provider's recommendations please.   2023 04:22

## 2025-03-11 ENCOUNTER — TELEPHONE (OUTPATIENT)
Age: 50
End: 2025-03-11

## 2025-03-11 ENCOUNTER — TELEMEDICINE (OUTPATIENT)
Dept: PSYCHIATRY | Facility: CLINIC | Age: 50
End: 2025-03-11

## 2025-03-11 ENCOUNTER — NURSE TRIAGE (OUTPATIENT)
Age: 50
End: 2025-03-11

## 2025-03-11 DIAGNOSIS — F41.1 GAD (GENERALIZED ANXIETY DISORDER): ICD-10-CM

## 2025-03-11 DIAGNOSIS — F33.2 MAJOR DEPRESSIVE DISORDER, RECURRENT SEVERE WITHOUT PSYCHOTIC FEATURES (HCC): Primary | ICD-10-CM

## 2025-03-11 DIAGNOSIS — G47.00 INSOMNIA, UNSPECIFIED TYPE: ICD-10-CM

## 2025-03-11 DIAGNOSIS — F40.01 PANIC DISORDER WITH AGORAPHOBIA: ICD-10-CM

## 2025-03-11 DIAGNOSIS — R06.2 WHEEZING: ICD-10-CM

## 2025-03-11 DIAGNOSIS — F42.2 MIXED OBSESSIONAL THOUGHTS AND ACTS: ICD-10-CM

## 2025-03-11 DIAGNOSIS — R06.02 SHORTNESS OF BREATH: ICD-10-CM

## 2025-03-11 PROCEDURE — 99214 OFFICE O/P EST MOD 30 MIN: CPT | Performed by: PSYCHIATRY & NEUROLOGY

## 2025-03-11 RX ORDER — ALBUTEROL SULFATE 90 UG/1
2 INHALANT RESPIRATORY (INHALATION) EVERY 6 HOURS PRN
Qty: 18 G | Refills: 0 | Status: SHIPPED | OUTPATIENT
Start: 2025-03-11

## 2025-03-11 NOTE — TELEPHONE ENCOUNTER
"Re- sent to pharmacy     Answer Assessment - Initial Assessment Questions  1. NAME of MEDICINE: \"What medicine(s) are you calling about?\"      albuterol (Ventolin HFA) 90 mcg/act inhaler [[id number]]    2. QUESTION: \"What is your question?\" (e.g., double dose of medicine, side effect)      pharmacy never received   3. PRESCRIBER: \"Who prescribed the medicine?\" Reason: if prescribed by specialist, call should be referred to that group.  Ordering and Authorizing Provider:  Tomasz Catherine MD    Protocols used: Medication Question Call-Adult-[place]    "
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
## 2025-03-11 NOTE — TELEPHONE ENCOUNTER
Pt called to get his appt for 3/11 at 12:30pm switched to a virtual visit due to preferring virtual appts.  Pt has [employer] and will connect with provider through [employer]. Writer switched appt and checked it in.

## 2025-03-11 NOTE — TELEPHONE ENCOUNTER
Patient called in regards to the pharmacy never receiving script and would like his inhaler resent in.

## 2025-03-11 NOTE — ASSESSMENT & PLAN NOTE
Improving in short term in context of recent COVID.  May potentially start Remeron.  Continue hydroxyzine syrup 2-5 mg QHS for insomnia, anxiety   Discussed CBT-I  shantanu.  Encouraged complementary treatments such as chamomile, lavender, exercise, meditation, and eating less processed foods.

## 2025-03-11 NOTE — ASSESSMENT & PLAN NOTE
Unstable. May potentially start Remeron.  Supportive therapy during med management visits.  Encouraged complementary treatments such as chamomile, lavender, exercise, meditation, and eating less processed foods

## 2025-03-11 NOTE — ASSESSMENT & PLAN NOTE
Continue hydroxyzine syrup 2-5 mg QHS for insomnia, anxiety   Supportive therapy during med management visits.  Encouraged complementary treatments such as chamomile, lavender, exercise, meditation, and eating less processed foods

## 2025-03-11 NOTE — ASSESSMENT & PLAN NOTE
Unstable. May potentially start Remeron.  Continue hydroxyzine syrup 2-5 mg QHS for insomnia, anxiety   Supportive therapy during med management visits.  Encouraged complementary treatments such as chamomile, lavender, exercise, meditation, and eating less processed foods

## 2025-03-11 NOTE — TELEPHONE ENCOUNTER
Regarding: Medication refill  ----- Message from Clarke MEDINA sent at 3/11/2025  9:27 AM EDT -----  Patient called in regards to pharmacy never receiving script for his Albuterol 90 mcg inhaler, patient will like medication resent to [id number] - [place] - [address].

## 2025-04-22 ENCOUNTER — TELEPHONE (OUTPATIENT)
Age: 50
End: 2025-04-22

## 2025-04-22 NOTE — TELEPHONE ENCOUNTER
Patient called in to confirm their upcoming medication management appointment.    Writer was able to confirm 4/23 @11:30am virtually.

## 2025-04-23 ENCOUNTER — TELEMEDICINE (OUTPATIENT)
Dept: PSYCHIATRY | Facility: CLINIC | Age: 50
End: 2025-04-23

## 2025-04-23 DIAGNOSIS — F42.2 MIXED OBSESSIONAL THOUGHTS AND ACTS: ICD-10-CM

## 2025-04-23 DIAGNOSIS — F33.2 MAJOR DEPRESSIVE DISORDER, RECURRENT SEVERE WITHOUT PSYCHOTIC FEATURES (HCC): Primary | ICD-10-CM

## 2025-04-23 DIAGNOSIS — G47.00 INSOMNIA, UNSPECIFIED TYPE: ICD-10-CM

## 2025-04-23 DIAGNOSIS — F41.1 GAD (GENERALIZED ANXIETY DISORDER): ICD-10-CM

## 2025-04-23 DIAGNOSIS — F40.01 PANIC DISORDER WITH AGORAPHOBIA: ICD-10-CM

## 2025-04-23 PROCEDURE — 99214 OFFICE O/P EST MOD 30 MIN: CPT | Performed by: PSYCHIATRY & NEUROLOGY

## 2025-04-23 RX ORDER — ESCITALOPRAM OXALATE 5 MG/1
5 TABLET ORAL DAILY
Qty: 30 TABLET | Refills: 2 | Status: SHIPPED | OUTPATIENT
Start: 2025-04-23

## 2025-04-23 NOTE — ASSESSMENT & PLAN NOTE
Start Lexapro 5 mg daily, patient is aware he may start with half a pill if preferred  Supportive therapy during med management visits  Orders:    escitalopram (LEXAPRO) 5 mg tablet; Take 1 tablet (5 mg total) by mouth daily

## 2025-04-23 NOTE — PSYCH
MEDICATION MANAGEMENT NOTE    Name: Devon Rollins Jr.      : 1975      MRN: [id number]  Encounter Provider: Viktoria Montes MD  Encounter Date: 2025   Encounter department: [facility]    Insurance: Payor: / No coverage found.     Reason for Visit:   Chief Complaint   Patient presents with    Follow-up    Depression    Anxiety    OCD    Insomnia    Virtual Regular Visit   :  Assessment & Plan  Major depressive disorder, recurrent severe without psychotic features (HCC)  Start Lexapro 5 mg daily, patient is aware he may start with half a pill if preferred  Supportive therapy during med management visits  Orders:    escitalopram (LEXAPRO) 5 mg tablet; Take 1 tablet (5 mg total) by mouth daily    TIMOTHY (generalized anxiety disorder)  Start Lexapro 5 mg daily, patient is aware he may start with half a pill if preferred  Has access to hydroxyzine 10 mg/5 ml syrup  Has access to Ativan 0.5 mg daily PRN for panic attacks  Supportive therapy during med management visits  Orders:    escitalopram (LEXAPRO) 5 mg tablet; Take 1 tablet (5 mg total) by mouth daily    Panic disorder with agoraphobia  Start Lexapro 5 mg daily, patient is aware he may start with half a pill if preferred  Has access to hydroxyzine 10 mg/5 ml syrup  Has access to Ativan 0.5 mg daily PRN for panic attacks  Supportive therapy during med management visits  Orders:    escitalopram (LEXAPRO) 5 mg tablet; Take 1 tablet (5 mg total) by mouth daily    Mixed obsessional thoughts and acts  Start Lexapro 5 mg daily, patient is aware he may start with half a pill if preferred  Supportive therapy during med management visits  Orders:    escitalopram (LEXAPRO) 5 mg tablet; Take 1 tablet (5 mg total) by mouth daily    Insomnia, unspecified type  Continue OTC magnesium           Treatment Recommendations:    Educated about diagnosis and treatment modalities. Verbalizes understanding and agreement with the  treatment plan.  Discussed self monitoring of symptoms, and symptom monitoring tools.  Discussed medications and if treatment adjustment was needed or desired.  Aware of 24 hour and weekend coverage for urgent situations accessed by calling [facility] main practice number  I am scheduling this patient out for greater than 3 months: No    Medications Risks/Benefits:      Risks, Benefits And Possible Side Effects Of Medications:    Risks, benefits, and possible side effects of medications explained to Devon and he (or legal representative) verbalizes understanding and agreement for treatment.    Controlled Medication Discussion:     Not applicable      History of Present Illness     Patient reports he is having some days that are good, some days that are bad. He states his job hunt is looking better. He is applying to larger [employer]. He did a one way interview with [employer] which did not lead to getting to job. He feels positive that he is now getting responses. He continues to sleep more than usual but continues to feel exhausted since getting COVID. He is sleeping 4-6 hours, wants to take a nap now. Taking magnesium regularly now and feels it help sleep and potentially anxiety. Falls asleep at 12-12:30. Eats late (8). Continues to experience panic attacks when events happen. He canceled going to [place]. Feels depressed in the email due to rumination about his situation about jobs.Got a tax refund which was helpful and surprising. Has 6 weeks of unemployment. OCD symptoms continue to escalate. Previously taking up 10 min, now 15 min a day.       Review Of Systems: A review of systems is obtained and is negative except for the pertinent positives listed in HPI/Subjective above.      Current Rating Scores:     Not Applicable    Areas of Improvement: reviewed in HPI/Subjective Section and reviewed in Assessment and Plan Section      Past Medical History:   Diagnosis Date    Allergic      Anxiety 1987    Arthritis     Contusion of left knee, initial encounter 05/29/2024    Depression 1995    Hyperlipidemia      Past Surgical History:   Procedure Laterality Date    COLONOSCOPY      KNEE ARTHROSCOPY      SHOULDER ARTHROSCOPY       Allergies: No Known Allergies    Current Outpatient Medications   Medication Instructions    albuterol (Ventolin HFA) 90 mcg/act inhaler 2 puffs, Inhalation, Every 6 hours PRN    escitalopram (LEXAPRO) 5 mg, Oral, Daily    fish oil-omega-3 fatty acids 1000 MG capsule Take by mouth    fluticasone (FLONASE) 50 mcg/act nasal spray 1 spray, Nasal, Daily    hydrOXYzine (ATARAX) 10 mg, Oral, 3 times daily PRN    ibuprofen (MOTRIN) 200 mg, As needed    LORazepam (ATIVAN) 0.5 mg, Oral, Daily PRN    Magnesium 100 MG CAPS Take by mouth    Multiple Vitamin (Tab-A-Constantino) TABS 1 tablet, Daily    senna (SENOKOT) 8.6 mg, Oral, Daily at bedtime        Substance Abuse History:    Tobacco, Alcohol and Drug Use History     Tobacco Use    Smoking status: Never     Passive exposure: Never    Smokeless tobacco: Never   Vaping Use    Vaping status: Never Used   Substance Use Topics    Alcohol use: Yes     Comment: Very occasionally.    Drug use: Never          Social History:    Social History     Socioeconomic History    Marital status: Single     Spouse name: Not on file    Number of children: Not on file    Years of education: Not on file    Highest education level: Not on file   Occupational History    Occupation: employed   Other Topics Concern    Not on file   Social History Narrative    Lives with: alone    Marital status: never     Children: none    Pets: none    Family: no family in [place], they live in [place], 4 siblings (he has an older sister, two younger sisters and a younger brother, mom passed away when he was 23)    Education: BS in psychology but experience in computer programming, certification as    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     Mental Status Evaluation:    Appearance age appropriate, casually dressed   Behavior cooperative, calm   Speech normal rate, normal volume, normal pitch, spontaneous   Mood depressed, anxious   Affect constricted   Thought Processes organized, goal directed   Thought Content no overt delusions   Perceptual Disturbances: no auditory hallucinations, no visual hallucinations   Abnormal Thoughts  Risk Potential Suicidal ideation - None  Homicidal ideation - None  Potential for aggression - No   Orientation oriented to person, place, time/date, and situation   Memory recent and remote memory grossly intact   Consciousness alert and awake   Attention Span Concentration Span attention span and concentration are age appropriate   Intellect appears to be of average intelligence   Insight fair   Judgement fair   Muscle Strength and  Gait normal muscle strength and normal muscle tone, normal gait and normal balance   Motor activity no abnormal movements   Language no difficulty naming common objects, no difficulty repeating a phrase, no difficulty writing a sentence   Fund of Knowledge adequate knowledge of current events  adequate fund of knowledge regarding past history  adequate fund of knowledge regarding vocabulary        Laboratory Results: I have personally reviewed all pertinent laboratory/tests results    Last Visit Labs:   No visits with results within 1 Month(s) from this visit.    Latest known visit with results is:   Office Visit on 12/24/2024   Component Date Value    Throat Culture 12/24/2024 Negative for beta-hemolytic Streptococcus        Suicide/Homicide Risk Assessment:    Risk of Harm to Self:  Based on today's assessment, Devon presents the following risk of harm to self: low    Risk of Harm to Others:  Based on today's assessment, Devon presents the following risk of harm to others: none    The following interventions are recommended: Continue medication management. No other intervention changes indicated at this time.    Psychotherapy Provided:     Individual psychotherapy provided: No    Treatment Plan:    Completed and signed during the session: Not applicable - Treatment Plan not due at this session.    Goals: Progress towards Treatment Plan goals - Yes, progressing slowly, as evidenced by subjective findings in HPI/Subjective Section and in Assessment and Plan Section    Depression Follow-up Plan Completed: Yes    Note Share:    This note was not shared with the patient due to reasonable likelihood of causing patient harm    Administrative Statements   Administrative Statements   Encounter provider Viktoria Montes MD    The Patient is located at Home and in the following state in which I hold an active license [place].    The patient was identified by name and date of birth. Devon Rollins Jr. was informed that this is a telemedicine visit and that the visit is being conducted through the Epic Embedded platform. He agrees to proceed..  My office door was closed. No one else was in the room.  He acknowledged consent and understanding of privacy and security of the video platform. The patient has agreed to participate and understands they can discontinue the visit at any time.    I have spent a total time of 30 minutes in caring for this patient on the day of the visit/encounter including Prognosis, Risks and benefits of tx options, Instructions for management, Patient and  family education, Importance of tx compliance, Risk factor reductions, Impressions, Counseling / Coordination of care, Documenting in the medical record, Reviewing/placing orders in the medical record (including tests, medications, and/or procedures), and Obtaining or reviewing history  , not including the time spent for establishing the audio/video connection.    Visit Time  Visit Start Time: 1130 am  Visit Stop Time: 12:00 pm  Total Visit Duration:  30 minutes        Viktoria Montes MD 04/23/25

## 2025-04-23 NOTE — ASSESSMENT & PLAN NOTE
Start Lexapro 5 mg daily, patient is aware he may start with half a pill if preferred  Has access to hydroxyzine 10 mg/5 ml syrup  Has access to Ativan 0.5 mg daily PRN for panic attacks  Supportive therapy during med management visits  Orders:    escitalopram (LEXAPRO) 5 mg tablet; Take 1 tablet (5 mg total) by mouth daily

## 2025-06-04 ENCOUNTER — TELEPHONE (OUTPATIENT)
Dept: PSYCHIATRY | Facility: CLINIC | Age: 50
End: 2025-06-04

## 2025-06-04 ENCOUNTER — TELEMEDICINE (OUTPATIENT)
Dept: PSYCHIATRY | Facility: CLINIC | Age: 50
End: 2025-06-04

## 2025-06-04 ENCOUNTER — TELEPHONE (OUTPATIENT)
Age: 50
End: 2025-06-04

## 2025-06-04 DIAGNOSIS — F42.2 MIXED OBSESSIONAL THOUGHTS AND ACTS: ICD-10-CM

## 2025-06-04 DIAGNOSIS — F41.1 GAD (GENERALIZED ANXIETY DISORDER): ICD-10-CM

## 2025-06-04 DIAGNOSIS — F33.2 MAJOR DEPRESSIVE DISORDER, RECURRENT SEVERE WITHOUT PSYCHOTIC FEATURES (HCC): Primary | ICD-10-CM

## 2025-06-04 DIAGNOSIS — F40.01 PANIC DISORDER WITH AGORAPHOBIA: ICD-10-CM

## 2025-06-04 DIAGNOSIS — G47.00 INSOMNIA, UNSPECIFIED TYPE: ICD-10-CM

## 2025-06-04 PROCEDURE — 99214 OFFICE O/P EST MOD 30 MIN: CPT | Performed by: PSYCHIATRY & NEUROLOGY

## 2025-06-04 NOTE — TELEPHONE ENCOUNTER
Writer called stating that patient is missing the [facility] Consent. Writer mention this form has to be signed in order to have there visit [facility]. [facility] Consent has been sent to [employer].

## 2025-06-04 NOTE — ASSESSMENT & PLAN NOTE
Start Lexapro 5 mg daily, patient is aware he may start with half a pill if preferred  Has access to hydroxyzine 10 mg/5 ml syrup  Has access to Ativan 0.5 mg daily PRN for panic attacks  Supportive therapy during med management visits

## 2025-06-04 NOTE — PSYCH
MEDICATION MANAGEMENT NOTE    Name: Devon Rollins Jr.      : 1975      MRN: [id number]  Encounter Provider: Viktoria Montes MD  Encounter Date: 2025   Encounter department: [facility]    Insurance: Payor: / No coverage found.     Reason for Visit:   Chief Complaint   Patient presents with    Virtual Regular Visit    Follow-up    Depression    Anxiety   :  Assessment & Plan  Major depressive disorder, recurrent severe without psychotic features (HCC)  Start Lexapro 5 mg daily, patient is aware he may start with half a pill if preferred  Supportive therapy during med management visits          TIMOTHY (generalized anxiety disorder)  Start Lexapro 5 mg daily, patient is aware he may start with half a pill if preferred  Has access to hydroxyzine 10 mg/5 ml syrup  Has access to Ativan 0.5 mg daily PRN for panic attacks  Supportive therapy during med management visits          Panic disorder with agoraphobia  Start Lexapro 5 mg daily, patient is aware he may start with half a pill if preferred  Has access to hydroxyzine 10 mg/5 ml syrup  Has access to Ativan 0.5 mg daily PRN for panic attacks  Supportive therapy during med management visits          Mixed obsessional thoughts and acts  Start Lexapro 5 mg daily, patient is aware he may start with half a pill if preferred  Supportive therapy during med management visits          Insomnia, unspecified type  Continue OTC magnesium PRN  Has access to hydroxyzine 10 mg/5 ml syrup              Treatment Recommendations:    Educated about diagnosis and treatment modalities. Verbalizes understanding and agreement with the treatment plan.  Discussed self monitoring of symptoms, and symptom monitoring tools.  Discussed medications and if treatment adjustment was needed or desired.  Aware of 24 hour and weekend coverage for urgent situations accessed by calling [facility] main practice number  I am  scheduling this patient out for greater than 3 months: No    Medications Risks/Benefits:      Risks, Benefits And Possible Side Effects Of Medications:    Risks, benefits, and possible side effects of medications explained to Devon and he (or legal representative) verbalizes understanding and agreement for treatment.    Controlled Medication Discussion:     Not applicable      History of Present Illness     Patient states he is going to the gym a few times a week. Drinking more caffeine and experiencing crashes. He states he is trying to wean himself down. Sleep is better but not great, 6 hours on average. Feeling more depressed. Check engine light on the car turned on, $1200 repair. He picked up Lexapro but has not started it due to ongoing anxiety about the side effects.    Review Of Systems: A review of systems is obtained and is negative except for the pertinent positives listed in HPI/Subjective above.      Current Rating Scores:     None completed today.    Areas of Improvement: reviewed in HPI/Subjective Section and reviewed in Assessment and Plan Section      Past Medical History[1]  Past Surgical History[2]  Allergies: Allergies[3]    Current Outpatient Medications   Medication Instructions    albuterol (Ventolin HFA) 90 mcg/act inhaler 2 puffs, Inhalation, Every 6 hours PRN    escitalopram (LEXAPRO) 5 mg, Oral, Daily    fish oil-omega-3 fatty acids 1000 MG capsule Take by mouth    fluticasone (FLONASE) 50 mcg/act nasal spray 1 spray, Nasal, Daily    hydrOXYzine (ATARAX) 10 mg, Oral, 3 times daily PRN    ibuprofen (MOTRIN) 200 mg, As needed    LORazepam (ATIVAN) 0.5 mg, Oral, Daily PRN    Magnesium 100 MG CAPS Take by mouth    Multiple Vitamin (Tab-A-Constantino) TABS 1 tablet, Daily    senna (SENOKOT) 8.6 mg, Oral, Daily at bedtime        Substance Abuse History:    Tobacco, Alcohol and Drug Use History     Tobacco Use    Smoking status: Never     Passive exposure: Never    Smokeless tobacco: Never   Vaping Use     Vaping status: Never Used   Substance Use Topics    Alcohol use: Yes     Comment: Very occasionally.    Drug use: Never          Social History:    Social History     Socioeconomic History    Marital status: Single     Spouse name: Not on file    Number of children: Not on file    Years of education: Not on file    Highest education level: Not on file   Occupational History    Occupation: employed   Other Topics Concern    Not on file   Social History Narrative    Lives with: alone    Marital status: never     Children: none    Pets: none    Family: no family in [place], they live in [place], 4 siblings (he has an older sister, two younger sisters and a younger brother, mom passed away when he was 23)    Education: BS in psychology but experience in computer programming, certification as    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     Mental Status Evaluation:    Appearance age appropriate, casually dressed   Behavior cooperative, calm   Speech normal rate, normal volume, normal pitch, spontaneous   Mood depressed   Affect constricted   Thought Processes organized, goal directed   Thought Content no overt delusions   Perceptual Disturbances: no auditory hallucinations, no visual hallucinations   Abnormal Thoughts  Risk Potential Suicidal ideation - None  Homicidal ideation - None  Potential for aggression - No   Orientation oriented to person, place, time/date, and situation   Memory recent and remote memory grossly intact   Consciousness alert and awake   Attention Span Concentration Span attention span and  concentration are age appropriate   Intellect appears to be of average intelligence   Insight fair   Judgement fair   Muscle Strength and  Gait normal muscle strength and normal muscle tone, normal gait and normal balance   Motor activity no abnormal movements   Language no difficulty naming common objects, no difficulty repeating a phrase, no difficulty writing a sentence   Fund of Knowledge adequate knowledge of current events  adequate fund of knowledge regarding past history  adequate fund of knowledge regarding vocabulary        Laboratory Results: I have personally reviewed all pertinent laboratory/tests results    Last Visit Labs:   No visits with results within 1 Month(s) from this visit.   Latest known visit with results is:   Office Visit on 12/24/2024   Component Date Value    Throat Culture 12/24/2024 Negative for beta-hemolytic Streptococcus        Suicide/Homicide Risk Assessment:    Risk of Harm to Self:  Based on today's assessment, Devon presents the following risk of harm to self: low    Risk of Harm to Others:  Based on today's assessment, Devon presents the following risk of harm to others: low    The following interventions are recommended: Continue medication management. No other intervention changes indicated at this time.    Psychotherapy Provided:     Individual psychotherapy provided: No    Treatment Plan:    Completed and signed during the session: Not applicable - Treatment Plan not due at this session.    Goals: Progress towards Treatment Plan goals - Yes, limited progress, as evidenced by subjective findings in HPI/Subjective Section and in Assessment and Plan Section    Depression Follow-up Plan Completed: Yes    Note Share:    This note was not shared with the patient due to reasonable likelihood of causing patient harm    Administrative Statements   Encounter provider Viktoria Montes MD    The Patient is located at Home and in the following state in which I hold an active license  "[place].    The patient was identified by name and date of birth. Devon Rollins JrToan was informed that this is a telemedicine visit and that the visit is being conducted through the Epic Embedded platform. He agrees to proceed..  My office door was closed. No one else was in the room.  He acknowledged consent and understanding of privacy and security of the video platform. The patient has agreed to participate and understands they can discontinue the visit at any time.    I have spent a total time of 30 minutes in caring for this patient on the day of the visit/encounter including Prognosis, Risks and benefits of tx options, Instructions for management, Patient and family education, Importance of tx compliance, Risk factor reductions, Impressions, Counseling / Coordination of care, Documenting in the medical record, Reviewing/placing orders in the medical record (including tests, medications, and/or procedures), and Obtaining or reviewing history  , not including the time spent for establishing the audio/video connection.    Visit Time  Visit Start Time: 11:00 am  Visit Stop Time: 11:30 am  Total Visit Duration: 30 minutes    Portions of the record may have been created with voice recognition software. Occasional wrong word or \"sound a like\" substitutions may have occurred due to the inherent limitations of voice recognition software. Read the chart carefully and recognize, using context, where substitutions have occurred.    Viktoria Montes MD 06/04/25       [1]   Past Medical History:  Diagnosis Date    Allergic     Anxiety 1987    Arthritis     Contusion of left knee, initial encounter 05/29/2024    Depression 1995    Hyperlipidemia    [2]   Past Surgical History:  Procedure Laterality Date    COLONOSCOPY      KNEE ARTHROSCOPY      SHOULDER ARTHROSCOPY     [3] No Known Allergies  [4]   Family History  Problem Relation Name Age of Onset    Melanoma Mother      Cancer Father      Heart attack Father      " Depression Sister      Mental illness Sister      Stomach cancer Cousin

## 2025-06-04 NOTE — ASSESSMENT & PLAN NOTE
Start Lexapro 5 mg daily, patient is aware he may start with half a pill if preferred  Supportive therapy during med management visits

## 2025-07-01 ENCOUNTER — TELEPHONE (OUTPATIENT)
Age: 50
End: 2025-07-01

## 2025-08-04 ENCOUNTER — OFFICE VISIT (OUTPATIENT)
Age: 50
End: 2025-08-04

## 2025-08-04 VITALS
WEIGHT: 151.4 LBS | OXYGEN SATURATION: 100 % | BODY MASS INDEX: 20.51 KG/M2 | RESPIRATION RATE: 16 BRPM | SYSTOLIC BLOOD PRESSURE: 118 MMHG | HEART RATE: 80 BPM | HEIGHT: 72 IN | TEMPERATURE: 97.8 F | DIASTOLIC BLOOD PRESSURE: 72 MMHG

## 2025-08-04 DIAGNOSIS — J34.89 SINUS PRESSURE: Primary | ICD-10-CM

## 2025-08-04 DIAGNOSIS — R53.83 FATIGUE, UNSPECIFIED TYPE: ICD-10-CM

## 2025-08-04 DIAGNOSIS — Z13.6 SCREENING FOR CARDIOVASCULAR CONDITION: ICD-10-CM

## 2025-08-04 DIAGNOSIS — F33.2 MAJOR DEPRESSIVE DISORDER, RECURRENT SEVERE WITHOUT PSYCHOTIC FEATURES (HCC): ICD-10-CM

## 2025-08-04 DIAGNOSIS — Z13.1 SCREENING FOR DIABETES MELLITUS: ICD-10-CM

## 2025-08-04 DIAGNOSIS — Z59.71 DOES NOT HAVE HEALTH INSURANCE: ICD-10-CM

## 2025-08-04 DIAGNOSIS — R51.9 SINUS HEADACHE: ICD-10-CM

## 2025-08-04 PROCEDURE — 99214 OFFICE O/P EST MOD 30 MIN: CPT | Performed by: STUDENT IN AN ORGANIZED HEALTH CARE EDUCATION/TRAINING PROGRAM

## 2025-08-06 ENCOUNTER — PATIENT OUTREACH (OUTPATIENT)
Dept: CASE MANAGEMENT | Facility: OTHER | Age: 50
End: 2025-08-06

## 2025-08-06 ENCOUNTER — TELEMEDICINE (OUTPATIENT)
Dept: PSYCHIATRY | Facility: CLINIC | Age: 50
End: 2025-08-06

## 2025-08-06 DIAGNOSIS — F40.01 PANIC DISORDER WITH AGORAPHOBIA: ICD-10-CM

## 2025-08-06 DIAGNOSIS — F33.2 MAJOR DEPRESSIVE DISORDER, RECURRENT SEVERE WITHOUT PSYCHOTIC FEATURES (HCC): Primary | ICD-10-CM

## 2025-08-06 DIAGNOSIS — F42.2 MIXED OBSESSIONAL THOUGHTS AND ACTS: ICD-10-CM

## 2025-08-06 DIAGNOSIS — G47.00 INSOMNIA, UNSPECIFIED TYPE: ICD-10-CM

## 2025-08-06 DIAGNOSIS — F41.1 GAD (GENERALIZED ANXIETY DISORDER): ICD-10-CM

## 2025-08-06 PROCEDURE — 99214 OFFICE O/P EST MOD 30 MIN: CPT | Performed by: PSYCHIATRY & NEUROLOGY

## 2025-08-07 ENCOUNTER — PATIENT OUTREACH (OUTPATIENT)
Dept: CASE MANAGEMENT | Facility: OTHER | Age: 50
End: 2025-08-07

## 2025-08-12 ENCOUNTER — PATIENT OUTREACH (OUTPATIENT)
Dept: CASE MANAGEMENT | Facility: OTHER | Age: 50
End: 2025-08-12

## 2025-08-18 ENCOUNTER — PATIENT OUTREACH (OUTPATIENT)
Dept: CASE MANAGEMENT | Facility: OTHER | Age: 50
End: 2025-08-18

## 2025-08-19 ENCOUNTER — PATIENT OUTREACH (OUTPATIENT)
Dept: CASE MANAGEMENT | Facility: OTHER | Age: 50
End: 2025-08-19

## 2025-08-22 ENCOUNTER — PATIENT OUTREACH (OUTPATIENT)
Dept: CASE MANAGEMENT | Facility: OTHER | Age: 50
End: 2025-08-22